# Patient Record
Sex: FEMALE | Race: WHITE | Employment: FULL TIME | URBAN - NONMETROPOLITAN AREA
[De-identification: names, ages, dates, MRNs, and addresses within clinical notes are randomized per-mention and may not be internally consistent; named-entity substitution may affect disease eponyms.]

---

## 2018-11-29 ENCOUNTER — MEDICAL CORRESPONDENCE (OUTPATIENT)
Dept: HEALTH INFORMATION MANAGEMENT | Facility: OTHER | Age: 38
End: 2018-11-29

## 2018-12-05 ENCOUNTER — HOSPITAL ENCOUNTER (OUTPATIENT)
Dept: MRI IMAGING | Facility: OTHER | Age: 38
Discharge: HOME | End: 2018-12-05
Attending: CHIROPRACTOR | Admitting: CHIROPRACTOR
Payer: COMMERCIAL

## 2018-12-05 DIAGNOSIS — S23.3XXA SPRAIN OF LIGAMENTS OF THORACIC SPINE: ICD-10-CM

## 2018-12-05 DIAGNOSIS — S33.100D: ICD-10-CM

## 2018-12-05 DIAGNOSIS — S13.4XXD SPRAIN OF LIGAMENTS OF CERVICAL SPINE, SUBSEQUENT ENCOUNTER: ICD-10-CM

## 2018-12-05 DIAGNOSIS — S23.100D SUBLUXATION OF THORACIC VERTEBRA, SUBSEQUENT ENCOUNTER: ICD-10-CM

## 2018-12-05 DIAGNOSIS — S13.100D: ICD-10-CM

## 2018-12-05 PROCEDURE — 72141 MRI NECK SPINE W/O DYE: CPT

## 2019-11-27 ENCOUNTER — HOSPITAL ENCOUNTER (OUTPATIENT)
Dept: MRI IMAGING | Facility: OTHER | Age: 39
Discharge: HOME | End: 2019-11-27
Attending: FAMILY MEDICINE | Admitting: FAMILY MEDICINE
Payer: COMMERCIAL

## 2019-11-27 DIAGNOSIS — M25.40 EFFUSION INTO JOINT: ICD-10-CM

## 2019-11-27 DIAGNOSIS — M25.461 SWELLING OF RIGHT KNEE JOINT: ICD-10-CM

## 2019-11-27 DIAGNOSIS — M25.561 RIGHT KNEE PAIN: ICD-10-CM

## 2019-11-27 PROCEDURE — 73721 MRI JNT OF LWR EXTRE W/O DYE: CPT | Mod: RT

## 2020-10-02 ENCOUNTER — HOSPITAL ENCOUNTER (EMERGENCY)
Facility: HOSPITAL | Age: 40
Discharge: HOME | End: 2020-10-02
Attending: PHYSICIAN ASSISTANT | Admitting: PHYSICIAN ASSISTANT

## 2020-10-02 VITALS
BODY MASS INDEX: 20.18 KG/M2 | WEIGHT: 125 LBS | HEART RATE: 70 BPM | SYSTOLIC BLOOD PRESSURE: 125 MMHG | DIASTOLIC BLOOD PRESSURE: 80 MMHG | RESPIRATION RATE: 16 BRPM | TEMPERATURE: 98.7 F | OXYGEN SATURATION: 100 %

## 2020-10-02 DIAGNOSIS — R00.2 PALPITATIONS: ICD-10-CM

## 2020-10-02 DIAGNOSIS — R07.89 CHEST DISCOMFORT: ICD-10-CM

## 2020-10-02 LAB
ANION GAP SERPL CALCULATED.3IONS-SCNC: 5 MMOL/L (ref 3–14)
BASOPHILS # BLD AUTO: 0.1 10E9/L (ref 0–0.2)
BASOPHILS NFR BLD AUTO: 1 %
BUN SERPL-MCNC: 15 MG/DL (ref 7–30)
CALCIUM SERPL-MCNC: 9.3 MG/DL (ref 8.5–10.1)
CHLORIDE SERPL-SCNC: 103 MMOL/L (ref 94–109)
CO2 SERPL-SCNC: 27 MMOL/L (ref 20–32)
CREAT SERPL-MCNC: 0.8 MG/DL (ref 0.52–1.04)
CRP SERPL-MCNC: <2.9 MG/L (ref 0–8)
D DIMER PPP FEU-MCNC: <0.3 UG/ML FEU (ref 0–0.5)
DIFFERENTIAL METHOD BLD: NORMAL
EOSINOPHIL # BLD AUTO: 0.1 10E9/L (ref 0–0.7)
EOSINOPHIL NFR BLD AUTO: 1 %
ERYTHROCYTE [DISTWIDTH] IN BLOOD BY AUTOMATED COUNT: 11.6 % (ref 10–15)
ERYTHROCYTE [SEDIMENTATION RATE] IN BLOOD BY WESTERGREN METHOD: 6 MM/H (ref 0–20)
GFR SERPL CREATININE-BSD FRML MDRD: >90 ML/MIN/{1.73_M2}
GLUCOSE SERPL-MCNC: 306 MG/DL (ref 70–99)
HCG SERPL QL: NEGATIVE
HCT VFR BLD AUTO: 38.1 % (ref 35–47)
HGB BLD-MCNC: 13.6 G/DL (ref 11.7–15.7)
IMM GRANULOCYTES # BLD: 0 10E9/L (ref 0–0.4)
IMM GRANULOCYTES NFR BLD: 0.4 %
LYMPHOCYTES # BLD AUTO: 1.6 10E9/L (ref 0.8–5.3)
LYMPHOCYTES NFR BLD AUTO: 31.6 %
MAGNESIUM SERPL-MCNC: 2.1 MG/DL (ref 1.6–2.3)
MCH RBC QN AUTO: 31.1 PG (ref 26.5–33)
MCHC RBC AUTO-ENTMCNC: 35.7 G/DL (ref 31.5–36.5)
MCV RBC AUTO: 87 FL (ref 78–100)
MONOCYTES # BLD AUTO: 0.3 10E9/L (ref 0–1.3)
MONOCYTES NFR BLD AUTO: 5.6 %
NEUTROPHILS # BLD AUTO: 3.1 10E9/L (ref 1.6–8.3)
NEUTROPHILS NFR BLD AUTO: 60.4 %
NRBC # BLD AUTO: 0 10*3/UL
NRBC BLD AUTO-RTO: 0 /100
PLATELET # BLD AUTO: 191 10E9/L (ref 150–450)
POTASSIUM SERPL-SCNC: 3.7 MMOL/L (ref 3.4–5.3)
RBC # BLD AUTO: 4.37 10E12/L (ref 3.8–5.2)
SODIUM SERPL-SCNC: 135 MMOL/L (ref 133–144)
TROPONIN I SERPL-MCNC: <0.015 UG/L (ref 0–0.04)
TSH SERPL DL<=0.005 MIU/L-ACNC: 0.55 MU/L (ref 0.4–4)
WBC # BLD AUTO: 5.2 10E9/L (ref 4–11)

## 2020-10-02 PROCEDURE — 86140 C-REACTIVE PROTEIN: CPT | Performed by: PHYSICIAN ASSISTANT

## 2020-10-02 PROCEDURE — 85025 COMPLETE CBC W/AUTO DIFF WBC: CPT | Performed by: PHYSICIAN ASSISTANT

## 2020-10-02 PROCEDURE — 80048 BASIC METABOLIC PNL TOTAL CA: CPT | Performed by: PHYSICIAN ASSISTANT

## 2020-10-02 PROCEDURE — 250N000011 HC RX IP 250 OP 636: Performed by: PHYSICIAN ASSISTANT

## 2020-10-02 PROCEDURE — 83735 ASSAY OF MAGNESIUM: CPT | Performed by: PHYSICIAN ASSISTANT

## 2020-10-02 PROCEDURE — 93005 ELECTROCARDIOGRAM TRACING: CPT

## 2020-10-02 PROCEDURE — 84443 ASSAY THYROID STIM HORMONE: CPT | Performed by: PHYSICIAN ASSISTANT

## 2020-10-02 PROCEDURE — 99284 EMERGENCY DEPT VISIT MOD MDM: CPT | Performed by: PHYSICIAN ASSISTANT

## 2020-10-02 PROCEDURE — 84703 CHORIONIC GONADOTROPIN ASSAY: CPT | Performed by: PHYSICIAN ASSISTANT

## 2020-10-02 PROCEDURE — 99284 EMERGENCY DEPT VISIT MOD MDM: CPT

## 2020-10-02 PROCEDURE — 85379 FIBRIN DEGRADATION QUANT: CPT | Performed by: PHYSICIAN ASSISTANT

## 2020-10-02 PROCEDURE — 84484 ASSAY OF TROPONIN QUANT: CPT | Performed by: PHYSICIAN ASSISTANT

## 2020-10-02 PROCEDURE — 85652 RBC SED RATE AUTOMATED: CPT | Performed by: PHYSICIAN ASSISTANT

## 2020-10-02 PROCEDURE — 93010 ELECTROCARDIOGRAM REPORT: CPT | Performed by: INTERNAL MEDICINE

## 2020-10-02 RX ORDER — DEXTROAMPHETAMINE SACCHARATE, AMPHETAMINE ASPARTATE, DEXTROAMPHETAMINE SULFATE AND AMPHETAMINE SULFATE 5; 5; 5; 5 MG/1; MG/1; MG/1; MG/1
20 TABLET ORAL 2 TIMES DAILY
Refills: 0 | COMMUNITY
Start: 2019-10-24 | End: 2021-03-31

## 2020-10-02 RX ORDER — ONDANSETRON 4 MG/1
8 TABLET, ORALLY DISINTEGRATING ORAL ONCE
Status: COMPLETED | OUTPATIENT
Start: 2020-10-02 | End: 2020-10-02

## 2020-10-02 RX ADMIN — ONDANSETRON 8 MG: 4 TABLET, ORALLY DISINTEGRATING ORAL at 20:37

## 2020-10-02 ASSESSMENT — ENCOUNTER SYMPTOMS
VOMITING: 0
FATIGUE: 1
EYES NEGATIVE: 1
CHEST TIGHTNESS: 1
NEUROLOGICAL NEGATIVE: 1
NAUSEA: 0
MUSCULOSKELETAL NEGATIVE: 1
PALPITATIONS: 1
WHEEZING: 0
COUGH: 0
FEVER: 0
ENDOCRINE NEGATIVE: 1
SHORTNESS OF BREATH: 0
ABDOMINAL PAIN: 0

## 2020-10-02 NOTE — ED NOTES
Patient passed out last Friday at 0100. Lost consciousness knew it was going to happen so laid down on the floor. States it took a day and a half to recover. Feels dizzy with some movements and lightheaded at times. Feels the palpitations. Normal HR is 50-60s. Feels fatigued at this time.

## 2020-10-02 NOTE — ED AVS SNAPSHOT
[facility]  [address] [address]  Phone: [phone]                                    Jaymie Morales   MRN: [id number]    Department: [facility]   Date of Visit: 10/2/2020           After Visit Summary Signature Page    I have received my discharge instructions, and my questions have been answered. I have discussed any challenges I see with this plan with the nurse or doctor.    ..........................................................................................................................................  Patient/Patient Representative Signature      ..........................................................................................................................................  Patient Representative Print Name and Relationship to Patient    ..................................................               ................................................  Date                                   Time    ..........................................................................................................................................  Reviewed by Signature/Title    ...................................................              ..............................................  Date                                               Time          22EPIC Rev 08/18

## 2020-10-03 NOTE — ED PROVIDER NOTES
"  History     Chief Complaint   Patient presents with     Palpitations     c/o \"feeling like my heart is being squeezed.\" c/o \"feeling like my heart is racing.\" syncopal episode one week ago.      HPI  Jaymie Morales is a 40 year old female who presents emergency department from home 1 week after a syncopal episode.  Patient reports that at the time of the syncopal episode, she began to feel lightheaded and, knowing she would pass out, laid down on the floor.  She does not know how long she was unconscious.  It took her about a day and a half to recover from the episode, feeling weak and fatigued during that time.  Patient reports a similar episode about 1 year ago that started while on a flight.  She felt so bad she ended up driving home with her mother and it took her some time to recover from that as well.  She is not aware of any observed episodes that would suggest seizure.      Patient is especially concerned about having rapid heart rate up to 130 today and elevated BP.  Normal HR for her is typically 50-60 and low BP.      Patient reports having syncopal episodes frequently since age 9.  She denies prior problems with chest pain as she describes today as (tightness-mild to moderate).  Symptoms are intermittently present regardless of level of physical activity.  Patient reports significant workup in the past including ECG, EEG, MRI.  She does not believe she has ever had stress test or echocardiogram.  She has no known prior history of arrhythmia.  No known family history of cardiac arrhythmia.  No personal or family history of PE/DVT.  Patient is not on birth control and does not smoke.   had vasectomy.      Reports problems with Raynaud's syndrome.  History of autoimmune disease resulting in Type 1 diabetes at age 30.  At age 33 had episode of severe abdominal pain while pregnant that resolved spontaneously.  Uncertain cause.  Patient has had 4 other similar episode with the most recent several " months ago.      Patient denies significant stressors. No problems with thyroid in the past.  Patient describes mild headache since episode.  No abdominal pain, nausea.      Allergies:  Allergies   Allergen Reactions     Sulfa Drugs Unknown and Hives     Sulfonamide Derivatives      Sulfa (Sulfonamide Antibiotics)       Problem List:    Patient Active Problem List    Diagnosis Date Noted     Diabetes mellitus type 1 (H) 2013     Priority: Medium        Past Medical History:    History reviewed. No pertinent past medical history.    Past Surgical History:    History reviewed. No pertinent surgical history.    Family History:    History reviewed. No pertinent family history.    Social History:  Marital Status:   [2]  Social History     Tobacco Use     Smoking status: Never Smoker     Smokeless tobacco: Never Used   Substance Use Topics     Alcohol use: No     Alcohol/week: 0.0 standard drinks     Drug use: Yes     Types: Marijuana     Comment: occasional        Medications:         amphetamine-dextroamphetamine (ADDERALL) 20 MG tablet       INSULIN PUMP - OUTPATIENT       Prenatal Multivit-Min-Fe-FA (PRE-MIRANDA FORMULA PO)       acetone, Urine, test (KETOSTIX) STRP       glucagon (GLUCAGON EMERGENCY) 1 MG injection       insulin aspart (NovoLOG) injection          Review of Systems   Constitutional: Positive for fatigue. Negative for fever.   HENT: Negative.    Eyes: Negative.    Respiratory: Positive for chest tightness. Negative for cough, shortness of breath and wheezing.    Cardiovascular: Positive for palpitations. Negative for chest pain and leg swelling.   Gastrointestinal: Negative for abdominal pain, nausea and vomiting.   Endocrine: Negative.    Genitourinary: Negative.    Musculoskeletal: Negative.    Skin: Negative.    Neurological: Negative.        Physical Exam   BP: 121/92  Pulse: 85  Temp: 98.4  F (36.9  C)  Resp: 16  Weight: 56.7 kg (125 lb)  SpO2: 96 %      Physical Exam  Constitutional:        General: She is not in acute distress.     Appearance: She is not diaphoretic.   HENT:      Head: Normocephalic and atraumatic.      Mouth/Throat:      Pharynx: No oropharyngeal exudate.   Eyes:      General: No scleral icterus.     Extraocular Movements: Extraocular movements intact.      Conjunctiva/sclera: Conjunctivae normal.      Pupils: Pupils are equal, round, and reactive to light.   Neck:      Musculoskeletal: Normal range of motion.   Cardiovascular:      Rate and Rhythm: Normal rate and regular rhythm.      Heart sounds: Normal heart sounds.   Pulmonary:      Effort: No respiratory distress.      Breath sounds: Normal breath sounds.   Abdominal:      General: Bowel sounds are normal.      Palpations: Abdomen is soft.      Tenderness: There is no abdominal tenderness.   Musculoskeletal:         General: No tenderness.   Skin:     General: Skin is warm.      Findings: No rash.   Neurological:      Mental Status: She is alert.         ED Course        Procedures    ECG and troponin unremarkable.  No concern for MI, no evidence of arrhythmia.  Order for Ziopatch placed.      BMP, Magnesium, TSH unremarkable.     D-dimer neg.  No concern for PE.  CXR neg for pneumonia, pneumothorax.     CBC unremarkable. Inflammatory markers unremarkable.      Neg pregnancy test.               EKG Interpretation:      Interpreted by ZEINAB Wade  Time reviewed: 1750  Symptoms at time of EKG: chest discomfort, palpitations   Rhythm: normal sinus   Rate: 68 bpm  Axis: normal  Ectopy: none  Conduction: normal  ST Segments/ T Waves: No ST-T wave changes  Q Waves: none  Comparison to prior: Unchanged from 12/24/2013    Clinical Impression: normal EKG           Results for orders placed or performed during the hospital encounter of 10/02/20 (from the past 24 hour(s))   CBC with platelets differential   Result Value Ref Range    WBC 5.2 4.0 - 11.0 10e9/L    RBC Count 4.37 3.8 - 5.2 10e12/L    Hemoglobin 13.6 11.7 - 15.7  g/dL    Hematocrit 38.1 35.0 - 47.0 %    MCV 87 78 - 100 fl    MCH 31.1 26.5 - 33.0 pg    MCHC 35.7 31.5 - 36.5 g/dL    RDW 11.6 10.0 - 15.0 %    Platelet Count 191 150 - 450 10e9/L    Diff Method Automated Method     % Neutrophils 60.4 %    % Lymphocytes 31.6 %    % Monocytes 5.6 %    % Eosinophils 1.0 %    % Basophils 1.0 %    % Immature Granulocytes 0.4 %    Nucleated RBCs 0 0 /100    Absolute Neutrophil 3.1 1.6 - 8.3 10e9/L    Absolute Lymphocytes 1.6 0.8 - 5.3 10e9/L    Absolute Monocytes 0.3 0.0 - 1.3 10e9/L    Absolute Eosinophils 0.1 0.0 - 0.7 10e9/L    Absolute Basophils 0.1 0.0 - 0.2 10e9/L    Abs Immature Granulocytes 0.0 0 - 0.4 10e9/L    Absolute Nucleated RBC 0.0    Basic metabolic panel   Result Value Ref Range    Sodium 135 133 - 144 mmol/L    Potassium 3.7 3.4 - 5.3 mmol/L    Chloride 103 94 - 109 mmol/L    Carbon Dioxide 27 20 - 32 mmol/L    Anion Gap 5 3 - 14 mmol/L    Glucose 306 (H) 70 - 99 mg/dL    Urea Nitrogen 15 7 - 30 mg/dL    Creatinine 0.80 0.52 - 1.04 mg/dL    GFR Estimate >90 >60 mL/min/[1.73_m2]    GFR Estimate If Black >90 >60 mL/min/[1.73_m2]    Calcium 9.3 8.5 - 10.1 mg/dL   Troponin I   Result Value Ref Range    Troponin I ES <0.015 0.000 - 0.045 ug/L   Magnesium   Result Value Ref Range    Magnesium 2.1 1.6 - 2.3 mg/dL   TSH with free T4 reflex   Result Value Ref Range    TSH 0.55 0.40 - 4.00 mU/L   HCG qualitative Blood   Result Value Ref Range    HCG Qualitative Serum Negative NEG^Negative   CRP inflammation   Result Value Ref Range    CRP Inflammation <2.9 0.0 - 8.0 mg/L   Erythrocyte sedimentation rate auto   Result Value Ref Range    Sed Rate 6 0 - 20 mm/h   D dimer quantitative   Result Value Ref Range    D Dimer <0.3 0.0 - 0.50 ug/ml FEU       Medications   ondansetron (ZOFRAN-ODT) ODT tab 8 mg (8 mg Oral Given 10/2/20 2037)       Assessments & Plan (with Medical Decision Making)     I have reviewed the nursing notes.    I have reviewed the findings, diagnosis, plan and  need for follow up with the patient.    Discharge Medication List as of 10/2/2020  8:24 PM          Final diagnoses:   Palpitations   Chest discomfort     ZEINAB Wade on 10/2/2020 at 10:33 PM   10/2/2020   [facility]     Sanju Morton PA  10/02/20 2232

## 2020-10-03 NOTE — ED NOTES
Pt given 8mg ODT zofran prior to discharge. Instructed on holter monitor placement early next week and given phone number in case she doesn't hear. Pt ambulated out of ER and will return or call with any further questions or concerns.

## 2020-10-03 NOTE — ED NOTES
"Pt ambulated halls and 2 flights of stairs with this writer. Spo2 95% and  at beginning of walk, after flights of stairs  and Spo2 95%. Pt continues to endorse feelings of \"different and not normal\" Worse when ambulating stairs than at rest.   "

## 2020-10-08 ENCOUNTER — TELEPHONE (OUTPATIENT)
Dept: EMERGENCY MEDICINE | Facility: HOSPITAL | Age: 40
End: 2020-10-08

## 2020-12-20 ENCOUNTER — HEALTH MAINTENANCE LETTER (OUTPATIENT)
Age: 40
End: 2020-12-20

## 2021-02-23 ENCOUNTER — TRANSFERRED RECORDS (OUTPATIENT)
Dept: SURGERY | Facility: CLINIC | Age: 41
End: 2021-02-23

## 2021-03-15 ENCOUNTER — OFFICE VISIT (OUTPATIENT)
Dept: SURGERY | Facility: CLINIC | Age: 41
End: 2021-03-15
Payer: COMMERCIAL

## 2021-03-15 ENCOUNTER — PREP FOR PROCEDURE (OUTPATIENT)
Dept: SURGERY | Facility: CLINIC | Age: 41
End: 2021-03-15

## 2021-03-15 VITALS
SYSTOLIC BLOOD PRESSURE: 118 MMHG | WEIGHT: 125 LBS | HEIGHT: 66 IN | BODY MASS INDEX: 20.09 KG/M2 | OXYGEN SATURATION: 100 % | HEART RATE: 89 BPM | DIASTOLIC BLOOD PRESSURE: 72 MMHG | RESPIRATION RATE: 16 BRPM

## 2021-03-15 DIAGNOSIS — K81.1 CHRONIC CHOLECYSTITIS: Primary | ICD-10-CM

## 2021-03-15 PROCEDURE — 99203 OFFICE O/P NEW LOW 30 MIN: CPT | Performed by: SURGERY

## 2021-03-15 ASSESSMENT — MIFFLIN-ST. JEOR: SCORE: 1253.75

## 2021-03-15 NOTE — PROGRESS NOTES
Chief complaint:  Abdominal pain, epigastric    HPI:  This patient is a 40 year old  female who presents with epigastric region abdominal pain for the past 10 years.  The pain is intermittent.  It occurs with no specific relation to eating.  She has had an EGD with biopsies negative for H. pylori and for sprue.    She is type I diabetic    Past Medical History:   has no past medical history on file.    Past Surgical History:  No past surgical history on file. Care everywhere indicates that she had a lysis of adhesions involving the left ovary, this seems to have been done laparoscopically.    Social History:  Social History     Socioeconomic History     Marital status:      Spouse name: Not on file     Number of children: Not on file     Years of education: Not on file     Highest education level: Not on file   Occupational History     Not on file   Social Needs     Financial resource strain: Not on file     Food insecurity     Worry: Not on file     Inability: Not on file     Transportation needs     Medical: Not on file     Non-medical: Not on file   Tobacco Use     Smoking status: Never Smoker     Smokeless tobacco: Never Used   Substance and Sexual Activity     Alcohol use: No     Alcohol/week: 0.0 standard drinks     Drug use: Yes     Types: Marijuana     Comment: occasional     Sexual activity: Not on file   Lifestyle     Physical activity     Days per week: Not on file     Minutes per session: Not on file     Stress: Not on file   Relationships     Social connections     Talks on phone: Not on file     Gets together: Not on file     Attends [employer] service: Not on file     Active member of club or organization: Not on file     Attends meetings of clubs or organizations: Not on file     Relationship status: Not on file     Intimate partner violence     Fear of current or ex partner: Not on file     Emotionally abused: Not on file     Physically abused: Not on file     Forced sexual activity:  Not on file   Other Topics Concern     Parent/sibling w/ CABG, MI or angioplasty before 65F 55M? Not Asked   Social History Narrative     Not on file        Family History:  No family history on file.    Review of Systems:  GI - no jaundice  resp - negative  The 10 point Review of Systems is negative other than noted in the HPI and above.    Physical Exam:  General - This is a well developed, well nourished female in no apparent distress.  HEENT - Normocephalic, atraumatic.  NO scleral icterus.  Skin - no jaundice or rash  Neck - supple without masses  Neurologic: alert, speech is clear, moves all extremities with good strength  Psychiatric: Mood and affect are appropriate  Lungs - respirations regular and non-labored.    Abdomen:   soft, non-distended with no tenderness noted . no masses palpated.  Glucose monitor is in place on her abdomen, she will move this to another location at the time of surgery..  Extremities - warm without edema      Relevant labs: None recently  Liver function panel- normal  WBC- normal  Lipase- normal    Imaging:  Ultrasound RUQ: negative cholelithiasis, negative gallbladder wall thickening, negative ductal dilatation (1.7 mm), negative pericholecystic fluid, negative sonographic Huang's sign.  Gallbladder radionuclide scan shows normal uptake and drainage but an ejection fraction of 6%  All imaging was performed at [facility]    Assessment and Plan:  It is my impression that she has chronic cholecystitis/biliary dyskinesia.  I have offered her a Laparoscopic cholecystectomy with cholangiograms.  We specifically discussed the possibility that her symptoms are not from her gallbladder and will not be resolved with cholecystectomy.  We have discussed the indication, risks and expected recovery.  Risks discussed included duct/ organ injury, conversion to open surgery with increased recovery, post cholecystectomy syndromes and their treatment.   We also discussed alternatives  including dissolution, lithotripsy and low fat diet. Literature was given to review.  We will work on scheduling surgery at the patient s convenience.    Heath Cutler MD  [facility]    Please route or send letter to:  Primary Care Provider (PCP), Referring Provider and Include Progress Note    Level 4

## 2021-03-15 NOTE — LETTER
March 15, 2021          [facility]  [address] [place] [address]      RE:   Jaymie Morales 1980      Dear Colleague,    Thank you for referring your patient, Jaymie Morales, to [facility]. Please see a copy of my visit note below.    Chief complaint:   Abdominal pain, epigastric     HPI:   This patient is a 40 year old  female who presents with epigastric region abdominal pain for the past 10 years. The pain is intermittent. It occurs with no specific relation to eating. She has had an     EGD with biopsies negative for H. pylori and for sprue. She is type I diabetic     Assessment and Plan:   It is my impression that she has chronic cholecystitis/biliary dyskinesia.   I have offered her a Laparoscopic cholecystectomy with cholangiograms. We specifically discussed the possibility that her symptoms are not from her gallbladder and will not be resolved with cholecystectomy. We have discussed the indication, risks and expected recovery. Risks discussed included duct/ organ injury, conversion to open surgery with increased recovery, post cholecystectomy syndromes and their treatment.   We also discussed alternatives including dissolution, lithotripsy and low fat diet. Literature was given to review.   We will work on scheduling surgery at the patient s convenience.      Again, thank you for allowing me to participate in the care of your patient.      Sincerely,      Heath Cutler MD

## 2021-03-17 ENCOUNTER — TELEPHONE (OUTPATIENT)
Dept: SURGERY | Facility: CLINIC | Age: 41
End: 2021-03-17

## 2021-03-17 DIAGNOSIS — Z11.59 ENCOUNTER FOR SCREENING FOR OTHER VIRAL DISEASES: ICD-10-CM

## 2021-03-31 RX ORDER — DEXTROAMPHETAMINE SACCHARATE, AMPHETAMINE ASPARTATE, DEXTROAMPHETAMINE SULFATE AND AMPHETAMINE SULFATE 2.5; 2.5; 2.5; 2.5 MG/1; MG/1; MG/1; MG/1
10 TABLET ORAL DAILY
Status: ON HOLD | COMMUNITY
End: 2021-04-02

## 2021-03-31 RX ORDER — DEXTROAMPHETAMINE SACCHARATE, AMPHETAMINE ASPARTATE, DEXTROAMPHETAMINE SULFATE AND AMPHETAMINE SULFATE 5; 5; 5; 5 MG/1; MG/1; MG/1; MG/1
20 TABLET ORAL EVERY MORNING
COMMUNITY

## 2021-04-01 ENCOUNTER — ANESTHESIA (OUTPATIENT)
Dept: SURGERY | Facility: CLINIC | Age: 41
End: 2021-04-01
Payer: COMMERCIAL

## 2021-04-01 ENCOUNTER — ANESTHESIA EVENT (OUTPATIENT)
Dept: SURGERY | Facility: CLINIC | Age: 41
End: 2021-04-01
Payer: COMMERCIAL

## 2021-04-01 ENCOUNTER — APPOINTMENT (OUTPATIENT)
Dept: GENERAL RADIOLOGY | Facility: CLINIC | Age: 41
End: 2021-04-01
Attending: SURGERY
Payer: COMMERCIAL

## 2021-04-01 ENCOUNTER — APPOINTMENT (OUTPATIENT)
Dept: CT IMAGING | Facility: CLINIC | Age: 41
End: 2021-04-01
Attending: PHYSICIAN ASSISTANT
Payer: COMMERCIAL

## 2021-04-01 ENCOUNTER — HOSPITAL ENCOUNTER (INPATIENT)
Facility: CLINIC | Age: 41
LOS: 5 days | Discharge: HOME | End: 2021-04-06
Attending: SURGERY | Admitting: SURGERY
Payer: COMMERCIAL

## 2021-04-01 ENCOUNTER — APPOINTMENT (OUTPATIENT)
Dept: SURGERY | Facility: PHYSICIAN GROUP | Age: 41
End: 2021-04-01
Payer: COMMERCIAL

## 2021-04-01 ENCOUNTER — RESULTS ONLY (OUTPATIENT)
Dept: ADMINISTRATIVE | Facility: CLINIC | Age: 41
End: 2021-04-01

## 2021-04-01 ENCOUNTER — SURGERY (OUTPATIENT)
Age: 41
End: 2021-04-01
Payer: COMMERCIAL

## 2021-04-01 DIAGNOSIS — K81.1 CHRONIC CHOLECYSTITIS: ICD-10-CM

## 2021-04-01 LAB
ANION GAP SERPL CALCULATED.3IONS-SCNC: 3 MMOL/L (ref 3–14)
BASE EXCESS BLDV CALC-SCNC: 1.5 MMOL/L
BUN SERPL-MCNC: 17 MG/DL (ref 7–30)
CALCIUM SERPL-MCNC: 7.8 MG/DL (ref 8.5–10.1)
CHLORIDE SERPL-SCNC: 107 MMOL/L (ref 94–109)
CO2 SERPL-SCNC: 28 MMOL/L (ref 20–32)
CREAT SERPL-MCNC: 0.83 MG/DL (ref 0.52–1.04)
ERYTHROCYTE [DISTWIDTH] IN BLOOD BY AUTOMATED COUNT: 12 % (ref 10–15)
ERYTHROCYTE [DISTWIDTH] IN BLOOD BY AUTOMATED COUNT: 12 % (ref 10–15)
GFR SERPL CREATININE-BSD FRML MDRD: 88 ML/MIN/{1.73_M2}
GLUCOSE BLDC GLUCOMTR-MCNC: 107 MG/DL (ref 70–99)
GLUCOSE BLDC GLUCOMTR-MCNC: 131 MG/DL (ref 70–99)
GLUCOSE SERPL-MCNC: 157 MG/DL (ref 70–99)
HCG UR QL: NEGATIVE
HCO3 BLDV-SCNC: 28 MMOL/L (ref 21–28)
HCT VFR BLD AUTO: 24.6 % (ref 35–47)
HCT VFR BLD AUTO: 34.1 % (ref 35–47)
HGB BLD-MCNC: 11.5 G/DL (ref 11.7–15.7)
HGB BLD-MCNC: 7.9 G/DL (ref 11.7–15.7)
HGB BLD-MCNC: 8.5 G/DL (ref 11.7–15.7)
HGB BLD-MCNC: 9.8 G/DL (ref 11.7–15.7)
LACTATE BLD-SCNC: 1.7 MMOL/L (ref 0.7–2)
MCH RBC QN AUTO: 32.2 PG (ref 26.5–33)
MCH RBC QN AUTO: 32.4 PG (ref 26.5–33)
MCHC RBC AUTO-ENTMCNC: 33.7 G/DL (ref 31.5–36.5)
MCHC RBC AUTO-ENTMCNC: 34.6 G/DL (ref 31.5–36.5)
MCV RBC AUTO: 93 FL (ref 78–100)
MCV RBC AUTO: 96 FL (ref 78–100)
O2/TOTAL GAS SETTING VFR VENT: ABNORMAL %
OXYHGB MFR BLDV: 27 %
PCO2 BLDV: 56 MM HG (ref 40–50)
PH BLDV: 7.32 PH (ref 7.32–7.43)
PLATELET # BLD AUTO: 228 10E9/L (ref 150–450)
PLATELET # BLD AUTO: 235 10E9/L (ref 150–450)
PO2 BLDV: 20 MM HG (ref 25–47)
POTASSIUM SERPL-SCNC: 4.1 MMOL/L (ref 3.4–5.3)
PROLACTIN SERPL-MCNC: 17 UG/L (ref 3–27)
RBC # BLD AUTO: 2.64 10E12/L (ref 3.8–5.2)
RBC # BLD AUTO: 3.55 10E12/L (ref 3.8–5.2)
SODIUM SERPL-SCNC: 138 MMOL/L (ref 133–144)
WBC # BLD AUTO: 10.1 10E9/L (ref 4–11)
WBC # BLD AUTO: 9.3 10E9/L (ref 4–11)

## 2021-04-01 PROCEDURE — 85027 COMPLETE CBC AUTOMATED: CPT | Performed by: ANESTHESIOLOGY

## 2021-04-01 PROCEDURE — 88304 TISSUE EXAM BY PATHOLOGIST: CPT | Mod: TC | Performed by: SURGERY

## 2021-04-01 PROCEDURE — 255N000002 HC RX 255 OP 636: Performed by: SURGERY

## 2021-04-01 PROCEDURE — 74177 CT ABD & PELVIS W/CONTRAST: CPT

## 2021-04-01 PROCEDURE — 370N000017 HC ANESTHESIA TECHNICAL FEE, PER MIN: Performed by: SURGERY

## 2021-04-01 PROCEDURE — 36415 COLL VENOUS BLD VENIPUNCTURE: CPT | Performed by: PHYSICIAN ASSISTANT

## 2021-04-01 PROCEDURE — 250N000011 HC RX IP 250 OP 636: Performed by: ANESTHESIOLOGY

## 2021-04-01 PROCEDURE — 88304 TISSUE EXAM BY PATHOLOGIST: CPT | Mod: 26 | Performed by: PATHOLOGY

## 2021-04-01 PROCEDURE — 85018 HEMOGLOBIN: CPT | Performed by: PHYSICIAN ASSISTANT

## 2021-04-01 PROCEDURE — 360N000076 HC SURGERY LEVEL 3, PER MIN: Performed by: SURGERY

## 2021-04-01 PROCEDURE — BF131ZZ FLUOROSCOPY OF GALLBLADDER AND BILE DUCTS USING LOW OSMOLAR CONTRAST: ICD-10-PCS | Performed by: SURGERY

## 2021-04-01 PROCEDURE — 0D9W4ZZ DRAINAGE OF PERITONEUM, PERCUTANEOUS ENDOSCOPIC APPROACH: ICD-10-PCS | Performed by: FAMILY MEDICINE

## 2021-04-01 PROCEDURE — 250N000011 HC RX IP 250 OP 636: Performed by: PHYSICIAN ASSISTANT

## 2021-04-01 PROCEDURE — 80048 BASIC METABOLIC PNL TOTAL CA: CPT | Performed by: ANESTHESIOLOGY

## 2021-04-01 PROCEDURE — 47563 LAPARO CHOLECYSTECTOMY/GRAPH: CPT | Performed by: SURGERY

## 2021-04-01 PROCEDURE — 86850 RBC ANTIBODY SCREEN: CPT | Performed by: PHYSICIAN ASSISTANT

## 2021-04-01 PROCEDURE — 258N000003 HC RX IP 258 OP 636: Performed by: INTERNAL MEDICINE

## 2021-04-01 PROCEDURE — 83605 ASSAY OF LACTIC ACID: CPT | Performed by: PHYSICIAN ASSISTANT

## 2021-04-01 PROCEDURE — 250N000009 HC RX 250: Performed by: NURSE ANESTHETIST, CERTIFIED REGISTERED

## 2021-04-01 PROCEDURE — 86900 BLOOD TYPING SEROLOGIC ABO: CPT | Performed by: PHYSICIAN ASSISTANT

## 2021-04-01 PROCEDURE — 99291 CRITICAL CARE FIRST HOUR: CPT | Performed by: INTERNAL MEDICINE

## 2021-04-01 PROCEDURE — 82805 BLOOD GASES W/O2 SATURATION: CPT | Performed by: PHYSICIAN ASSISTANT

## 2021-04-01 PROCEDURE — 86923 COMPATIBILITY TEST ELECTRIC: CPT | Performed by: PHYSICIAN ASSISTANT

## 2021-04-01 PROCEDURE — 93010 ELECTROCARDIOGRAM REPORT: CPT | Mod: 76 | Performed by: INTERNAL MEDICINE

## 2021-04-01 PROCEDURE — 86901 BLOOD TYPING SEROLOGIC RH(D): CPT | Performed by: PHYSICIAN ASSISTANT

## 2021-04-01 PROCEDURE — 710N000009 HC RECOVERY PHASE 1, LEVEL 1, PER MIN: Performed by: SURGERY

## 2021-04-01 PROCEDURE — 258N000003 HC RX IP 258 OP 636: Performed by: ANESTHESIOLOGY

## 2021-04-01 PROCEDURE — 999N000141 HC STATISTIC PRE-PROCEDURE NURSING ASSESSMENT: Performed by: SURGERY

## 2021-04-01 PROCEDURE — 710N000012 HC RECOVERY PHASE 2, PER MINUTE: Performed by: SURGERY

## 2021-04-01 PROCEDURE — 258N000003 HC RX IP 258 OP 636: Performed by: SURGERY

## 2021-04-01 PROCEDURE — 10140 I&D HMTMA SEROMA/FLUID COLLJ: CPT | Mod: 78 | Performed by: SURGERY

## 2021-04-01 PROCEDURE — 70450 CT HEAD/BRAIN W/O DYE: CPT

## 2021-04-01 PROCEDURE — 93005 ELECTROCARDIOGRAM TRACING: CPT

## 2021-04-01 PROCEDURE — 99222 1ST HOSP IP/OBS MODERATE 55: CPT | Performed by: PHYSICIAN ASSISTANT

## 2021-04-01 PROCEDURE — 250N000011 HC RX IP 250 OP 636: Performed by: NURSE ANESTHETIST, CERTIFIED REGISTERED

## 2021-04-01 PROCEDURE — 36415 COLL VENOUS BLD VENIPUNCTURE: CPT | Performed by: ANESTHESIOLOGY

## 2021-04-01 PROCEDURE — 250N000011 HC RX IP 250 OP 636: Performed by: INTERNAL MEDICINE

## 2021-04-01 PROCEDURE — 47563 LAPARO CHOLECYSTECTOMY/GRAPH: CPT | Mod: AS | Performed by: PHYSICIAN ASSISTANT

## 2021-04-01 PROCEDURE — 99207 PR NO BILLABLE SERVICE THIS VISIT: CPT | Performed by: INTERNAL MEDICINE

## 2021-04-01 PROCEDURE — 360N000083 HC SURGERY LEVEL 3 W/ FLUORO, PER MIN: Performed by: SURGERY

## 2021-04-01 PROCEDURE — 120N000001 HC R&B MED SURG/OB

## 2021-04-01 PROCEDURE — 258N000003 HC RX IP 258 OP 636: Performed by: PHYSICIAN ASSISTANT

## 2021-04-01 PROCEDURE — 999N000157 HC STATISTIC RCP TIME EA 10 MIN

## 2021-04-01 PROCEDURE — 250N000011 HC RX IP 250 OP 636: Performed by: SURGERY

## 2021-04-01 PROCEDURE — 999N000179 XR SURGERY CARM FLUORO LESS THAN 5 MIN W STILLS

## 2021-04-01 PROCEDURE — 81025 URINE PREGNANCY TEST: CPT | Performed by: ANESTHESIOLOGY

## 2021-04-01 PROCEDURE — 250N000013 HC RX MED GY IP 250 OP 250 PS 637: Performed by: ANESTHESIOLOGY

## 2021-04-01 PROCEDURE — 0FT44ZZ RESECTION OF GALLBLADDER, PERCUTANEOUS ENDOSCOPIC APPROACH: ICD-10-PCS | Performed by: SURGERY

## 2021-04-01 PROCEDURE — 250N000013 HC RX MED GY IP 250 OP 250 PS 637: Performed by: PHYSICIAN ASSISTANT

## 2021-04-01 PROCEDURE — 250N000009 HC RX 250: Performed by: SURGERY

## 2021-04-01 PROCEDURE — 999N001017 HC STATISTIC GLUCOSE BY METER IP

## 2021-04-01 PROCEDURE — 250N000013 HC RX MED GY IP 250 OP 250 PS 637: Performed by: SURGERY

## 2021-04-01 PROCEDURE — 84146 ASSAY OF PROLACTIN: CPT | Performed by: ANESTHESIOLOGY

## 2021-04-01 PROCEDURE — 06L44ZZ OCCLUSION OF HEPATIC VEIN, PERCUTANEOUS ENDOSCOPIC APPROACH: ICD-10-PCS | Performed by: FAMILY MEDICINE

## 2021-04-01 PROCEDURE — 272N000001 HC OR GENERAL SUPPLY STERILE: Performed by: SURGERY

## 2021-04-01 PROCEDURE — 84443 ASSAY THYROID STIM HORMONE: CPT | Performed by: INTERNAL MEDICINE

## 2021-04-01 PROCEDURE — 258N000001 HC RX 258: Performed by: SURGERY

## 2021-04-01 PROCEDURE — 85027 COMPLETE CBC AUTOMATED: CPT | Performed by: PHYSICIAN ASSISTANT

## 2021-04-01 RX ORDER — SODIUM CHLORIDE 9 MG/ML
INJECTION, SOLUTION INTRAVENOUS CONTINUOUS
Status: DISCONTINUED | OUTPATIENT
Start: 2021-04-01 | End: 2021-04-02

## 2021-04-01 RX ORDER — ONDANSETRON 2 MG/ML
4 INJECTION INTRAMUSCULAR; INTRAVENOUS EVERY 30 MIN PRN
Status: DISCONTINUED | OUTPATIENT
Start: 2021-04-01 | End: 2021-04-01 | Stop reason: HOSPADM

## 2021-04-01 RX ORDER — LIDOCAINE 40 MG/G
CREAM TOPICAL
Status: DISCONTINUED | OUTPATIENT
Start: 2021-04-01 | End: 2021-04-02 | Stop reason: HOSPADM

## 2021-04-01 RX ORDER — SIMETHICONE 80 MG
80 TABLET,CHEWABLE ORAL EVERY 6 HOURS PRN
Status: DISCONTINUED | OUTPATIENT
Start: 2021-04-01 | End: 2021-04-06 | Stop reason: HOSPADM

## 2021-04-01 RX ORDER — LIDOCAINE HYDROCHLORIDE 10 MG/ML
INJECTION, SOLUTION INFILTRATION; PERINEURAL PRN
Status: DISCONTINUED | OUTPATIENT
Start: 2021-04-01 | End: 2021-04-02

## 2021-04-01 RX ORDER — FENTANYL CITRATE 50 UG/ML
50 INJECTION, SOLUTION INTRAMUSCULAR; INTRAVENOUS EVERY 10 MIN PRN
Status: DISCONTINUED | OUTPATIENT
Start: 2021-04-01 | End: 2021-04-05

## 2021-04-01 RX ORDER — FENTANYL CITRATE 50 UG/ML
50 INJECTION, SOLUTION INTRAMUSCULAR; INTRAVENOUS ONCE
Status: COMPLETED | OUTPATIENT
Start: 2021-04-01 | End: 2021-04-01

## 2021-04-01 RX ORDER — KETOROLAC TROMETHAMINE 30 MG/ML
30 INJECTION, SOLUTION INTRAMUSCULAR; INTRAVENOUS EVERY 6 HOURS PRN
Status: DISCONTINUED | OUTPATIENT
Start: 2021-04-01 | End: 2021-04-01 | Stop reason: HOSPADM

## 2021-04-01 RX ORDER — NALOXONE HYDROCHLORIDE 0.4 MG/ML
0.4 INJECTION, SOLUTION INTRAMUSCULAR; INTRAVENOUS; SUBCUTANEOUS
Status: DISCONTINUED | OUTPATIENT
Start: 2021-04-01 | End: 2021-04-06 | Stop reason: HOSPADM

## 2021-04-01 RX ORDER — PROPOFOL 10 MG/ML
INJECTION, EMULSION INTRAVENOUS CONTINUOUS PRN
Status: DISCONTINUED | OUTPATIENT
Start: 2021-04-01 | End: 2021-04-01

## 2021-04-01 RX ORDER — NALOXONE HYDROCHLORIDE 0.4 MG/ML
0.2 INJECTION, SOLUTION INTRAMUSCULAR; INTRAVENOUS; SUBCUTANEOUS
Status: DISCONTINUED | OUTPATIENT
Start: 2021-04-01 | End: 2021-04-01 | Stop reason: HOSPADM

## 2021-04-01 RX ORDER — SODIUM CHLORIDE, SODIUM LACTATE, POTASSIUM CHLORIDE, CALCIUM CHLORIDE 600; 310; 30; 20 MG/100ML; MG/100ML; MG/100ML; MG/100ML
INJECTION, SOLUTION INTRAVENOUS CONTINUOUS
Status: DISCONTINUED | OUTPATIENT
Start: 2021-04-01 | End: 2021-04-01 | Stop reason: HOSPADM

## 2021-04-01 RX ORDER — IOPAMIDOL 755 MG/ML
500 INJECTION, SOLUTION INTRAVASCULAR ONCE
Status: COMPLETED | OUTPATIENT
Start: 2021-04-01 | End: 2021-04-01

## 2021-04-01 RX ORDER — NALOXONE HYDROCHLORIDE 0.4 MG/ML
0.2 INJECTION, SOLUTION INTRAMUSCULAR; INTRAVENOUS; SUBCUTANEOUS
Status: DISCONTINUED | OUTPATIENT
Start: 2021-04-01 | End: 2021-04-06 | Stop reason: HOSPADM

## 2021-04-01 RX ORDER — NEOSTIGMINE METHYLSULFATE 1 MG/ML
VIAL (ML) INJECTION PRN
Status: DISCONTINUED | OUTPATIENT
Start: 2021-04-01 | End: 2021-04-01

## 2021-04-01 RX ORDER — FENTANYL CITRATE 50 UG/ML
INJECTION, SOLUTION INTRAMUSCULAR; INTRAVENOUS PRN
Status: DISCONTINUED | OUTPATIENT
Start: 2021-04-01 | End: 2021-04-01

## 2021-04-01 RX ORDER — ACETAMINOPHEN 325 MG/1
975 TABLET ORAL ONCE
Status: COMPLETED | OUTPATIENT
Start: 2021-04-01 | End: 2021-04-01

## 2021-04-01 RX ORDER — GLYCOPYRROLATE 0.2 MG/ML
INJECTION, SOLUTION INTRAMUSCULAR; INTRAVENOUS PRN
Status: DISCONTINUED | OUTPATIENT
Start: 2021-04-01 | End: 2021-04-02

## 2021-04-01 RX ORDER — ONDANSETRON 2 MG/ML
INJECTION INTRAMUSCULAR; INTRAVENOUS PRN
Status: DISCONTINUED | OUTPATIENT
Start: 2021-04-01 | End: 2021-04-01

## 2021-04-01 RX ORDER — CEFAZOLIN SODIUM 2 G/100ML
2 INJECTION, SOLUTION INTRAVENOUS SEE ADMIN INSTRUCTIONS
Status: DISCONTINUED | OUTPATIENT
Start: 2021-04-01 | End: 2021-04-02 | Stop reason: HOSPADM

## 2021-04-01 RX ORDER — CEFAZOLIN SODIUM 2 G/100ML
2 INJECTION, SOLUTION INTRAVENOUS
Status: DISCONTINUED | OUTPATIENT
Start: 2021-04-01 | End: 2021-04-01 | Stop reason: HOSPADM

## 2021-04-01 RX ORDER — LIDOCAINE HYDROCHLORIDE 10 MG/ML
INJECTION, SOLUTION INFILTRATION; PERINEURAL PRN
Status: DISCONTINUED | OUTPATIENT
Start: 2021-04-01 | End: 2021-04-01

## 2021-04-01 RX ORDER — MEPERIDINE HYDROCHLORIDE 25 MG/ML
12.5 INJECTION INTRAMUSCULAR; INTRAVENOUS; SUBCUTANEOUS
Status: DISCONTINUED | OUTPATIENT
Start: 2021-04-01 | End: 2021-04-01 | Stop reason: HOSPADM

## 2021-04-01 RX ORDER — DEXAMETHASONE SODIUM PHOSPHATE 4 MG/ML
INJECTION, SOLUTION INTRA-ARTICULAR; INTRALESIONAL; INTRAMUSCULAR; INTRAVENOUS; SOFT TISSUE PRN
Status: DISCONTINUED | OUTPATIENT
Start: 2021-04-01 | End: 2021-04-01

## 2021-04-01 RX ORDER — PROPOFOL 10 MG/ML
INJECTION, EMULSION INTRAVENOUS PRN
Status: DISCONTINUED | OUTPATIENT
Start: 2021-04-01 | End: 2021-04-01

## 2021-04-01 RX ORDER — LIDOCAINE 40 MG/G
CREAM TOPICAL
Status: DISCONTINUED | OUTPATIENT
Start: 2021-04-01 | End: 2021-04-02

## 2021-04-01 RX ORDER — ONDANSETRON 4 MG/1
4 TABLET, ORALLY DISINTEGRATING ORAL EVERY 6 HOURS PRN
Status: DISCONTINUED | OUTPATIENT
Start: 2021-04-01 | End: 2021-04-02

## 2021-04-01 RX ORDER — LABETALOL 20 MG/4 ML (5 MG/ML) INTRAVENOUS SYRINGE
10
Status: DISCONTINUED | OUTPATIENT
Start: 2021-04-01 | End: 2021-04-01 | Stop reason: HOSPADM

## 2021-04-01 RX ORDER — PROPOFOL 10 MG/ML
INJECTION, EMULSION INTRAVENOUS PRN
Status: DISCONTINUED | OUTPATIENT
Start: 2021-04-01 | End: 2021-04-02

## 2021-04-01 RX ORDER — METOPROLOL TARTRATE 1 MG/ML
INJECTION, SOLUTION INTRAVENOUS PRN
Status: DISCONTINUED | OUTPATIENT
Start: 2021-04-01 | End: 2021-04-01

## 2021-04-01 RX ORDER — NALOXONE HYDROCHLORIDE 0.4 MG/ML
0.4 INJECTION, SOLUTION INTRAMUSCULAR; INTRAVENOUS; SUBCUTANEOUS
Status: DISCONTINUED | OUTPATIENT
Start: 2021-04-01 | End: 2021-04-01 | Stop reason: HOSPADM

## 2021-04-01 RX ORDER — HYDROMORPHONE HCL IN WATER/PF 6 MG/30 ML
0.2 PATIENT CONTROLLED ANALGESIA SYRINGE INTRAVENOUS
Status: DISCONTINUED | OUTPATIENT
Start: 2021-04-01 | End: 2021-04-02

## 2021-04-01 RX ORDER — ACETAMINOPHEN 325 MG/1
650 TABLET ORAL EVERY 6 HOURS PRN
Status: DISCONTINUED | OUTPATIENT
Start: 2021-04-01 | End: 2021-04-02

## 2021-04-01 RX ORDER — BUPIVACAINE HYDROCHLORIDE AND EPINEPHRINE 2.5; 5 MG/ML; UG/ML
INJECTION, SOLUTION EPIDURAL; INFILTRATION; INTRACAUDAL; PERINEURAL PRN
Status: DISCONTINUED | OUTPATIENT
Start: 2021-04-01 | End: 2021-04-01 | Stop reason: HOSPADM

## 2021-04-01 RX ORDER — OXYCODONE HYDROCHLORIDE 5 MG/1
5-10 TABLET ORAL EVERY 4 HOURS PRN
Qty: 10 TABLET | Refills: 0 | Status: SHIPPED | OUTPATIENT
Start: 2021-04-01 | End: 2021-04-06

## 2021-04-01 RX ORDER — CEFAZOLIN SODIUM 2 G/100ML
2 INJECTION, SOLUTION INTRAVENOUS
Status: DISCONTINUED | OUTPATIENT
Start: 2021-04-01 | End: 2021-04-02 | Stop reason: HOSPADM

## 2021-04-01 RX ORDER — DEXTROAMPHETAMINE SACCHARATE, AMPHETAMINE ASPARTATE, DEXTROAMPHETAMINE SULFATE AND AMPHETAMINE SULFATE 2.5; 2.5; 2.5; 2.5 MG/1; MG/1; MG/1; MG/1
10 TABLET ORAL DAILY
Status: DISCONTINUED | OUTPATIENT
Start: 2021-04-01 | End: 2021-04-03 | Stop reason: CLARIF

## 2021-04-01 RX ORDER — GLYCOPYRROLATE 0.2 MG/ML
INJECTION, SOLUTION INTRAMUSCULAR; INTRAVENOUS PRN
Status: DISCONTINUED | OUTPATIENT
Start: 2021-04-01 | End: 2021-04-01

## 2021-04-01 RX ORDER — OXYCODONE HYDROCHLORIDE 5 MG/1
5-10 TABLET ORAL
Status: DISCONTINUED | OUTPATIENT
Start: 2021-04-01 | End: 2021-04-02

## 2021-04-01 RX ORDER — ONDANSETRON 2 MG/ML
4 INJECTION INTRAMUSCULAR; INTRAVENOUS EVERY 6 HOURS PRN
Status: DISCONTINUED | OUTPATIENT
Start: 2021-04-01 | End: 2021-04-02

## 2021-04-01 RX ORDER — ONDANSETRON 4 MG/1
4 TABLET, ORALLY DISINTEGRATING ORAL EVERY 30 MIN PRN
Status: DISCONTINUED | OUTPATIENT
Start: 2021-04-01 | End: 2021-04-01 | Stop reason: HOSPADM

## 2021-04-01 RX ORDER — ONDANSETRON 4 MG/1
4 TABLET, FILM COATED ORAL EVERY 8 HOURS PRN
Qty: 9 TABLET | Refills: 0 | Status: SHIPPED | OUTPATIENT
Start: 2021-04-01 | End: 2021-04-06

## 2021-04-01 RX ORDER — IBUPROFEN 600 MG/1
600 TABLET, FILM COATED ORAL EVERY 6 HOURS PRN
Status: DISCONTINUED | OUTPATIENT
Start: 2021-04-01 | End: 2021-04-06 | Stop reason: HOSPADM

## 2021-04-01 RX ORDER — DEXTROAMPHETAMINE SACCHARATE, AMPHETAMINE ASPARTATE, DEXTROAMPHETAMINE SULFATE AND AMPHETAMINE SULFATE 5; 5; 5; 5 MG/1; MG/1; MG/1; MG/1
20 TABLET ORAL EVERY MORNING
Status: DISCONTINUED | OUTPATIENT
Start: 2021-04-02 | End: 2021-04-03 | Stop reason: CLARIF

## 2021-04-01 RX ORDER — CEFAZOLIN SODIUM 2 G/100ML
2 INJECTION, SOLUTION INTRAVENOUS SEE ADMIN INSTRUCTIONS
Status: DISCONTINUED | OUTPATIENT
Start: 2021-04-01 | End: 2021-04-01 | Stop reason: HOSPADM

## 2021-04-01 RX ADMIN — FENTANYL CITRATE 100 MCG: 50 INJECTION, SOLUTION INTRAMUSCULAR; INTRAVENOUS at 10:13

## 2021-04-01 RX ADMIN — GLYCOPYRROLATE 0.2 MG: 0.2 INJECTION, SOLUTION INTRAMUSCULAR; INTRAVENOUS at 23:41

## 2021-04-01 RX ADMIN — SODIUM CHLORIDE: 9 INJECTION, SOLUTION INTRAVENOUS at 18:53

## 2021-04-01 RX ADMIN — NEOSTIGMINE METHYLSULFATE 2.5 MG: 1 INJECTION, SOLUTION INTRAVENOUS at 11:26

## 2021-04-01 RX ADMIN — SODIUM CHLORIDE, POTASSIUM CHLORIDE, SODIUM LACTATE AND CALCIUM CHLORIDE: 600; 310; 30; 20 INJECTION, SOLUTION INTRAVENOUS at 23:55

## 2021-04-01 RX ADMIN — FENTANYL CITRATE 50 MCG: 50 INJECTION, SOLUTION INTRAMUSCULAR; INTRAVENOUS at 10:41

## 2021-04-01 RX ADMIN — PROPOFOL 150 MG: 10 INJECTION, EMULSION INTRAVENOUS at 23:55

## 2021-04-01 RX ADMIN — CEFAZOLIN SODIUM 2 G: 2 INJECTION, SOLUTION INTRAVENOUS at 23:57

## 2021-04-01 RX ADMIN — ROCURONIUM BROMIDE 50 MG: 10 INJECTION INTRAVENOUS at 10:13

## 2021-04-01 RX ADMIN — ONDANSETRON HYDROCHLORIDE 4 MG: 2 INJECTION, SOLUTION INTRAVENOUS at 11:21

## 2021-04-01 RX ADMIN — SODIUM CHLORIDE, POTASSIUM CHLORIDE, SODIUM LACTATE AND CALCIUM CHLORIDE: 600; 310; 30; 20 INJECTION, SOLUTION INTRAVENOUS at 10:57

## 2021-04-01 RX ADMIN — ACETAMINOPHEN 650 MG: 325 TABLET, FILM COATED ORAL at 21:38

## 2021-04-01 RX ADMIN — SODIUM CHLORIDE, POTASSIUM CHLORIDE, SODIUM LACTATE AND CALCIUM CHLORIDE: 600; 310; 30; 20 INJECTION, SOLUTION INTRAVENOUS at 09:50

## 2021-04-01 RX ADMIN — IBUPROFEN 600 MG: 600 TABLET ORAL at 18:20

## 2021-04-01 RX ADMIN — SODIUM CHLORIDE 1000 ML: 9 INJECTION, SOLUTION INTRAVENOUS at 17:52

## 2021-04-01 RX ADMIN — MIDAZOLAM 2 MG: 1 INJECTION INTRAMUSCULAR; INTRAVENOUS at 10:05

## 2021-04-01 RX ADMIN — ACETAMINOPHEN 975 MG: 325 TABLET, FILM COATED ORAL at 12:55

## 2021-04-01 RX ADMIN — LIDOCAINE HYDROCHLORIDE 30 MG: 10 INJECTION, SOLUTION INFILTRATION; PERINEURAL at 10:13

## 2021-04-01 RX ADMIN — ONDANSETRON 4 MG: 2 INJECTION INTRAMUSCULAR; INTRAVENOUS at 21:24

## 2021-04-01 RX ADMIN — ROCURONIUM BROMIDE 30 MG: 10 INJECTION INTRAVENOUS at 23:55

## 2021-04-01 RX ADMIN — FENTANYL CITRATE 50 MCG: 50 INJECTION, SOLUTION INTRAMUSCULAR; INTRAVENOUS at 10:46

## 2021-04-01 RX ADMIN — FENTANYL CITRATE 50 MCG: 50 INJECTION, SOLUTION INTRAMUSCULAR; INTRAVENOUS at 12:16

## 2021-04-01 RX ADMIN — IOPAMIDOL 65 ML: 755 INJECTION, SOLUTION INTRAVENOUS at 23:03

## 2021-04-01 RX ADMIN — DEXAMETHASONE SODIUM PHOSPHATE 4 MG: 4 INJECTION, SOLUTION INTRA-ARTICULAR; INTRALESIONAL; INTRAMUSCULAR; INTRAVENOUS; SOFT TISSUE at 10:13

## 2021-04-01 RX ADMIN — GLYCOPYRROLATE 0.2 MG: 0.2 INJECTION, SOLUTION INTRAMUSCULAR; INTRAVENOUS at 10:13

## 2021-04-01 RX ADMIN — KETOROLAC TROMETHAMINE 30 MG: 30 INJECTION, SOLUTION INTRAMUSCULAR at 12:13

## 2021-04-01 RX ADMIN — SODIUM CHLORIDE 500 ML: 9 INJECTION, SOLUTION INTRAVENOUS at 21:15

## 2021-04-01 RX ADMIN — SIMETHICONE 80 MG: 80 TABLET, CHEWABLE ORAL at 20:08

## 2021-04-01 RX ADMIN — PROPOFOL 200 MG: 10 INJECTION, EMULSION INTRAVENOUS at 10:13

## 2021-04-01 RX ADMIN — METOPROLOL TARTRATE 1 MG: 5 INJECTION INTRAVENOUS at 11:15

## 2021-04-01 RX ADMIN — GLYCOPYRROLATE 0.2 MG: 0.2 INJECTION, SOLUTION INTRAMUSCULAR; INTRAVENOUS at 11:25

## 2021-04-01 RX ADMIN — MIDAZOLAM 1 MG: 1 INJECTION INTRAMUSCULAR; INTRAVENOUS at 23:53

## 2021-04-01 RX ADMIN — LIDOCAINE HYDROCHLORIDE 50 MG: 10 INJECTION, SOLUTION INFILTRATION; PERINEURAL at 23:55

## 2021-04-01 RX ADMIN — PROPOFOL 40 MCG/KG/MIN: 10 INJECTION, EMULSION INTRAVENOUS at 10:16

## 2021-04-01 RX ADMIN — CEFAZOLIN SODIUM 2 G: 2 INJECTION, SOLUTION INTRAVENOUS at 10:05

## 2021-04-01 ASSESSMENT — MIFFLIN-ST. JEOR: SCORE: 1271.89

## 2021-04-01 NOTE — OP NOTE
General Surgery Operative Note    PREOPERATIVE DIAGNOSIS:  Chronic cholecystitis [K81.1]    POSTOPERATIVE DIAGNOSIS:  Same, adhesions of liver to the diaphragm    PROCEDURE:  Laparoscopic Cholecystectomy with fluoroscopic intra-operative cholangiogram    ANESTHESIA:  General.    PREOPERATIVE MEDICATIONS:  Ancef IV.    SURGEON:  Heath Cutler MD    ASSISTANT:  Marilu Hansen PA-C The physicians assistant was medically necessary for their expertise in camera management, suctioning, suturing, and retraction.                            ESTIMATED BLOOD LOSS:  5cc's    INDICATIONS:  Jaymie Morales is a 40 year old female who has been experiencing episodes of epigastric and RUQ abdominal pain.  Abdominal imaging has revealed a dramatically reduced gallbladder ejection fraction.  She now presents for laparoscopic cholecystectomy after having risks and benefits reviewed in detail.  We specifically discussed the possibility that her symptoms will not be relieved with cholecystectomy, as she has no gallstones on ultrasound.      PROCEDURE:  The patient was brought to the operating room, placed supine on the table and after induction of anesthetic, the abdomen was prepped and draped in sterile manner.  Pause was performed.  An incision was made above the umbilicus and extended down to the midline fascia which was then opened.  A Tanya trocar was inserted.  Gas was insufflated.  The laparoscope was advanced.  There was no evidence or underlying bowel or tissue injury.  Secondary trocars were placed under laparoscopic guidance.  The gallbladder was grasped and retracted cephalad.  The infundibulum was grasped and retracted laterally.  The region of the cystic duct was stripped of its peritoneal and fatty coverings and followed toward its confluence with the common hepatic and common bile ducts.  The cystic artery was caudal to the cystic duct suggesting a replaced right hepatic artery.  The region behind the  infundibulum was also dissected.  A fluoroscopic cholangiogram was obtained with the Kelley catheter and clamp.  This showed normal anatomy, no filling defects to suggest stones and good drainage into the duodenum.  On the film, I question the possibility that there may be a aberrant right posterior sectoral duct entering the cystic duct near its junction with the common hepatic duct.  Radiology reviewed the films and felt the anatomy was normal.  The cholangiogram catheter was removed.  The gallbladder was then clipped at the infundibulum and 2 clips were placed on the cystic duct a generous distance from its confluence with the common hepatic and common bile ducts.  I also made certain to place the clips far from the junction of the cystic and common hepatic duct to avoid any potential for closure of the above-mentioned possible aberrant duct.  The cystic duct was then cut from the gallbladder.  Similarly, the cystic artery was triply clipped and divided after completion of the cholangiogram.  The gallbladder was then removed from its hepatic fossa with electrocautery.  A small artery was identified coursing up the gallbladder fossa, this was clipped near the apex of the gallbladder fossa after first controlling with cautery.  Once the gallbladder was removed from its fossa, it was placed in an Endocatch pouch and removed from the abdomen through the supraumbilical incision without spillage of bile or stones.  Marcaine was placed at each of the trocar sites and they were sequentially removed and viewed from within to be sure no bleeding was present and none was seen.  The final trocar was then removed after venting as much gas as possible from the abdomen and the fascia was specifically closed with 0 Vicryl sutures.  Subcuticular skin closure was performed followed by placement of Steri-Strips and dressings and she was returned to the recovery room in excellent condition with all sponge and needle counts correct,  having tolerated the procedure well.    INTRAOPERATIVE FINDINGS: Normal-appearing liver surface, normal anterior stomach and anterior duodenum, normal terminal ileum and appendix, no evidence for inguinal hernia, limited evaluation of the pelvis showed no unusual fluid, normal ovaries and uterine fundus.    Specimens:   ID Type Source Tests Collected by Time Destination   A : GALLBLADDER AND CONTENTS Tissue Gallbladder and Contents SURGICAL PATHOLOGY EXAM Heath Cutler MD 4/1/2021 11:24 AM        Heath Cutler MD

## 2021-04-01 NOTE — PROGRESS NOTES
"While in PACU, pt felt suddenly nauseous, became bradycardic and \"passed out\". Reported to be minimally responsive for a minute or two. No unusual motor activity noted.   Discussing with patient and , these episodes have happened many times before and some have suggested possible seizures. With continued observation, her HR has increased to 60's and BP came up.     In discussion with pt and , they agree with admission to allow more time for recovery and hopefully find a cause for these episodes. Discussed with hospitalist, they will also manage DM1. Will admit to monitored bed.    Heath Cutler MD  4/1/2021 5:34 PM    "

## 2021-04-01 NOTE — OR NURSING
Pt in discharge area post lap brunilda with spouse in room.  Pt up to void, upon arrival back to room pt states she felt nauseous and appeared pale.  Pt reclined back to supine position in chair, pt then lost consciousness.  HR 38-42 via pulse O2 probe, BP 80/49,  via insulin pump, pt awoke after a couple minutes. Pt's eyes are open but pt not responding for a few minutes, pt then began responding and talking.  Dr. Hale in room to assess pt when she regained consicousness, 12lead EKG done and pt's BP up to 95/52, HR 50.  Spouse states pt has done this before and they were advised it may be seizure activity.  Dr. Hale advised pt to be admitted overnight, Dr. Cutler called and notified of episode, order to draw labs.  Hospitalist consult in [facility],  Pt will transfer to floor overnight.

## 2021-04-01 NOTE — ANESTHESIA PROCEDURE NOTES
Airway       Patient location during procedure: OR  Staff -        CRNA: Shahana Key APRN CRNA       Performed By: CRNA  Consent for Airway        Urgency: elective  Indications and Patient Condition       Indications for airway management: jennifer-procedural       Induction type:intravenous       Mask difficulty assessment: 1 - vent by mask    Final Airway Details       Final airway type: endotracheal airway       Successful airway: ETT - single and Oral  Endotracheal Airway Details        ETT size (mm): 7.0       Cuffed: yes       Successful intubation technique: direct laryngoscopy       DL Blade Type: Moser 2       Grade View of Cords: 2 (anterior)       Adjucts: stylet       Position: Right       Measured from: gums/teeth       Secured at (cm): 22       Bite block used: Soft    Post intubation assessment        Placement verified by: capnometry, equal breath sounds and chest rise        Number of attempts at approach: 1       Number of other approaches attempted: 0       Secured with: plastic tape       Ease of procedure: easy       Dentition: Intact and Unchanged

## 2021-04-01 NOTE — CONSULTS
"Hospitalist Consultation      Jaymie Morales MRN# [id number]   YOB: 1980 Age: 40 year old   Date of Admission: 4/1/2021     Requesting Physician:  Dr. Cutler  Reason for consult: Medical management           Assessment and Plan:   This patient is a 40 year old female with a PMH significant for DM I with insulin pump, ADD and previous episodes of syncope who is POD 0 s/p cholecystectomy for chronic cholecystitis. We were asked for stat consult due to episode of passing out with bradycardia and hypotension.    # S/p cholecystectomy due to chronic cholecystitis  Patient had routine cholecystectomy performed as an outpatient today due to chronic cholecystitis symptoms.  This was an uncomplicated procedure however postoperatively she had a syncopal episode which will be discussed below.  -Control as ordered by surgery  -PCDs ordered    #Loss of consciousness suspect syncope versus seizure disorder  She describes at least 100 episodes of loss of consciousness since she was a kid.  He describes presyncopal symptoms including an \"aura\" that warrants her to lay down but sometimes she is not quick enough.  When she was younger she would recover quickly from these episodes but with the most recent episodes she has had symptoms that sound possibly post ictal with difficulty speaking, fatigue and brain fogginess.  After one episode she also recalls having hypertension and tachycardia for at least a week.  She had 2 of these episodes so far today and blood pressure was as low as 80/49 with heart rate in the 38-42 range.  Are asked to observe her overnight due to this.  I did observe 1 of these episodes and there was no seizure-like activity glucose in the 140 range.  Lab work following this episode shows a normal lactic acid and hemoglobin drop from the 14 range to 9.8.  EKG showed a sinus bradycardia.   -Telemetry  -Echocardiogram  -CT head  -EEG to assess for seizure like activity  -Normal lactic acid is " "reassuring for non seizure etiology  -Recheck Hgb in 4 hours given this was slightly low  -Fluid support as ordered by surgery  -Neurology consult    #DM I  No recent A1c but patient seems pretty well controlled on pump  -She may continue to use pump    #ADD  -Continue Adderall            DVT Prophylaxis: on PCDs  D/C planning: To home once able             History of Present Illness:   She reports for the past couple of days she has been struggling with abdominal pain related to her gallbladder.  Today following the procedure she developed lightheadedness, nausea and a \"aura\" that on further clarification she describes as the room closing in before her loss of consciousness.  Since the episode she has had a frontal headache which is not usual after her  episodes.  She had another episode of loss of consciousness when she was in the room on the observation unit.  She reports that she has had 100s of episodes throughout her lifetime but the episodes most recently she describes brain fogginess, difficulty speaking and fatigue following the episodes.  She has been told that these may be seizures but is yet to have a work-up for this.  She generally feels them coming on and just lies down hoping that the symptoms will pass.  Currently she only has abdominal pain but wants to eat and drink.                Past Medical History:     Past Medical History:   Diagnosis Date     Complication of anesthesia     Slow to wake after knee surgery     Diabetes (H)     Type 1               Past Surgical History:     Past Surgical History:   Procedure Laterality Date     ABDOMEN SURGERY       ARTHROSCOPY KNEE Right                  Social History:     Social History     Tobacco Use     Smoking status: Never Smoker     Smokeless tobacco: Never Used   Substance Use Topics     Alcohol use: Yes     Alcohol/week: 0.0 standard drinks     Comment: Socially     Drug use: Yes     Types: Marijuana     Comment: occasional                 Family " History:   Family Hx fully reviewed and is non contributory to this admission.             Allergies:     Allergies   Allergen Reactions     Sulfa Drugs Unknown and Hives     Sulfonamide Derivatives      Sulfa (Sulfonamide Antibiotics)             Medications:     Prior to Admission medications    Medication Sig Last Dose Taking? Auth Provider   amphetamine-dextroamphetamine (ADDERALL) 10 MG tablet Take 10 mg by mouth daily Takes at Noon 3/31/2021 at Unknown time Yes Reported, Patient   amphetamine-dextroamphetamine (ADDERALL) 20 MG tablet Take 20 mg by mouth every morning 3/31/2021 at Unknown time Yes Reported, Patient   INSULIN PUMP - OUTPATIENT Date last updated:  14  Medtronic Minimed: Model 551  BASAL RATES and times:  12   AM (midnight): 0.3 units/hour    7     AM: 0.1 units/hour   8    PM: 0.25 units/hour   Basal Pattern A:  Jaymie CHLOE Morales will use when N/A.  CARB RATIO and times:  12   AM (midnight): 12  Corection Factor (Sensitivity) and times:  12   AM (midnight): 95 mg/dL  BLOOD GLUCOSE TARGET and times:  12   AM (midnight): 90 - 120  6     AM:  80 - 110  9    PM:  90 - 120  Active Insulin Time:  4 hours  Sensor:  Yes: 12   AM (midnight): 65 - 200  Carelink / Diasend username:  [email or web address]  Carelink / Diasend Password:  [email or web address]  Yes Reported, Patient   ondansetron (ZOFRAN) 4 MG tablet Take 1 tablet (4 mg) by mouth every 8 hours as needed for nausea  Yes Heath Cutler MD   oxyCODONE (ROXICODONE) 5 MG tablet Take 1-2 tablets (5-10 mg) by mouth every 4 hours as needed for moderate to severe pain  Yes Heath Cutler MD   Prenatal Multivit-Min-Fe-FA (PRE- FORMULA PO) chewable  Yes Reported, Patient   acetone, Urine, test (KETOSTIX) STRP    Reported, Patient   glucagon (GLUCAGON EMERGENCY) 1 MG injection 1 mg once   Reported, Patient   insulin aspart (NovoLOG) injection Use with Insulin Pump   Reported, Patient               Review of Systems:     A comprehensive greater than 10 system review  "of systems was carried out.  Pertinent positives and negatives are noted above.  Otherwise negative for contributory info.            Physical Exam:   Vitals were reviewed  Blood pressure 108/69, pulse (!) 42, temperature 97.9  F (36.6  C), temperature source Temporal, resp. rate 12, height 1.676 m (5' 6\"), weight 58.5 kg (129 lb), last menstrual period 03/25/2021, SpO2 98 %.  Exam:    GENERAL:  Comfortable.  PSYCH: pleasant, oriented, No acute distress.  HEENT:  PERRLA. Normal conjunctiva, normal hearing, nasal mucosa and Oropharynx are normal.  NECK:  Supple, no neck vein distention, adenopathy or bruits, normal thyroid.  HEART:  Normal S1, S2 with no murmur, no pericardial rub, S3 or S4.  LUNGS:  Clear to auscultation, normal Respiratory effort.  ABDOMEN:  Soft, no hepatosplenomegaly, normal bowel sounds. Tender to palpation.  EXTREMITIES:  No pedal edema, +2 pulses bilateral and equal.  SKIN:  Dry to touch, No rash, wound or ulcerations.  NEUROLOGIC:  Nonfocal with normal cranial nerve and motor power and sensation.            Data:   Past 24 hours labs, studies, and imaging were reviewed.  Recent Labs   Lab 04/01/21 1803 04/01/21  1534   WBC  --  9.3   HGB 9.8* 11.5*   HCT  --  34.1*   MCV  --  96   PLT  --  235     Recent Labs   Lab 04/01/21  1803   LACT 1.7       Goldie Perkins PA-C        "

## 2021-04-01 NOTE — ANESTHESIA PREPROCEDURE EVALUATION
Anesthesia Pre-Procedure Evaluation    Patient: Jaymie Morales   MRN: [id number] : 1980        Preoperative Diagnosis: Chronic cholecystitis [K81.1]   Procedure : Procedure(s):  LAPAROSCOPIC CHOLECYSTECTOMY WITH CHOLANGIOGRAMS     Past Medical History:   Diagnosis Date     Complication of anesthesia     Slow to wake after knee surgery     Diabetes (H)     Type 1      Past Surgical History:   Procedure Laterality Date     ABDOMEN SURGERY       ARTHROSCOPY KNEE Right       Allergies   Allergen Reactions     Sulfa Drugs Unknown and Hives     Sulfonamide Derivatives      Sulfa (Sulfonamide Antibiotics)      Social History     Tobacco Use     Smoking status: Never Smoker     Smokeless tobacco: Never Used   Substance Use Topics     Alcohol use: Yes     Alcohol/week: 0.0 standard drinks     Comment: Socially      Wt Readings from Last 1 Encounters:   21 58.5 kg (129 lb)        Anesthesia Evaluation   Pt has had prior anesthetic. Type: General.    No history of anesthetic complications       ROS/MED HX  ENT/Pulmonary:  - neg pulmonary ROS     Neurologic:  - neg neurologic ROS     Cardiovascular:  - neg cardiovascular ROS     METS/Exercise Tolerance:     Hematologic:  - neg hematologic  ROS     Musculoskeletal:  - neg musculoskeletal ROS     GI/Hepatic:     (+) cholecystitis/cholelithiasis,     Renal/Genitourinary:  - neg Renal ROS     Endo:     (+) type I DM, Using insulin,     Psychiatric/Substance Use:  - neg psychiatric ROS     Infectious Disease:  - neg infectious disease ROS     Malignancy:       Other:            Physical Exam    Airway        Mallampati: II   TM distance: > 3 FB   Neck ROM: full   Mouth opening: > 3 cm    Respiratory Devices and Support         Dental  no notable dental history         Cardiovascular   cardiovascular exam normal          Pulmonary   pulmonary exam normal                OUTSIDE LABS:  CBC:   Lab Results   Component Value Date    WBC 5.2 10/02/2020    HGB 13.6  10/02/2020    HGB 10.0 (L) 12/25/2013    HCT 38.1 10/02/2020    HCT 27.5 (L) 12/25/2013     10/02/2020     12/25/2013     BMP:   Lab Results   Component Value Date     10/02/2020     12/24/2013    POTASSIUM 3.7 10/02/2020    POTASSIUM 3.0 (L) 12/24/2013    CHLORIDE 103 10/02/2020    CHLORIDE 104 12/24/2013    CO2 27 10/02/2020    CO2 25 12/24/2013    BUN 15 10/02/2020    BUN 12 12/24/2013    CR 0.80 10/02/2020    CR 0.73 12/24/2013     (H) 10/02/2020    GLC 85 12/24/2013     COAGS: No results found for: PTT, INR, FIBR  POC:   Lab Results   Component Value Date    HCGS Negative 10/02/2020     HEPATIC:   Lab Results   Component Value Date    ALBUMIN 3.4 (L) 12/24/2013    PROTTOTAL 6.6 12/24/2013     OTHER:   Lab Results   Component Value Date    A1C 6.9 (A) 09/19/2015    JOSE A 9.3 10/02/2020    MAG 2.1 10/02/2020    TSH 0.55 10/02/2020    CRP <2.9 10/02/2020    SED 6 10/02/2020       Anesthesia Plan    ASA Status:  2      Anesthesia Type: General.     - Airway: ETT   Induction: Intravenous.   Maintenance: Balanced (Precedex, ketamine. Pt. would like to minimize opiates. Also, propofol gtt).        Consents         - Extended Intubation/Ventilatory Support Discussed: No.      - Patient is DNR/DNI Status: No    Use of blood products discussed: No .     Postoperative Care    Pain management: IV analgesics, Oral pain medications.   PONV prophylaxis: Ondansetron (or other 5HT-3), Dexamethasone or Solumedrol, Background Propofol Infusion     Comments:                Sanju Thompson MD

## 2021-04-01 NOTE — ANESTHESIA CARE TRANSFER NOTE
Patient: Jaymie Morales    Procedure(s):  LAPAROSCOPIC CHOLECYSTECTOMY WITH CHOLANGIOGRAMS    Diagnosis: Chronic cholecystitis [K81.1]  Diagnosis Additional Information: No value filed.    Anesthesia Type:   General     Note:    Oropharynx: oropharynx clear of all foreign objects  Level of Consciousness: drowsy  Oxygen Supplementation: face mask  Level of Supplemental Oxygen (L/min / FiO2): 4  Independent Airway: airway patency satisfactory and stable  Dentition: dentition unchanged  Vital Signs Stable: post-procedure vital signs reviewed and stable  Report to RN Given: handoff report given  Patient transferred to: PACU    Handoff Report: Identifed the Patient, Identified the Reponsible Provider, Reviewed the pertinent medical history, Discussed the surgical course, Reviewed Intra-OP anesthesia mangement and issues during anesthesia, Set expectations for post-procedure period and Allowed opportunity for questions and acknowledgement of understanding      Vitals: (Last set prior to Anesthesia Care Transfer)  CRNA VITALS  4/1/2021 1118 - 4/1/2021 1155      4/1/2021             EKG:  Sinus tachycardia        Electronically Signed By: KATHERINE Chopra CRNA  April 1, 2021  11:55 AM

## 2021-04-01 NOTE — DISCHARGE INSTRUCTIONS
HOME CARE FOLLOWING LAPAROSCOPIC CHOLECYSTECTOMY  AFIA Samson, HAILE Mota R. O Donnell, J. Shaheen    INCISIONAL CARE:  Replace the bandage over your incisions DAILY until all drainage stops, or if more comfortable to have in place.  If present, leave the steri-strips (white paper tapes) in place for 14 days after surgery.     BATHING:  OK to shower 48 hours after surgery.  Avoid baths for 1 week after surgery.  You may wash your hair at any time.  Gently pat your incision dry after bathing.  Do not apply lotions, creams, or ointments to incisions.    ACTIVITY:  Light Activity -- you may immediately be up and about as tolerated.  Walking is encouraged, increase as tolerated.  Driving/Light Work-- when comfortable and off narcotic pain medications.  Strenuous Work/Activity -- limit lifting to 20 pounds for 2 weeks.  Progressively increase with time.  Active Sports (running, biking, etc.) -- cautiously resume after 2 weeks.    DISCOMFORT:  Local anesthetic placed at surgery should provide relief for 4-8 hours.  Begin taking pain pills before discomfort is severe.  Take the pain medication with some food, when possible, to minimize side effects.  Intermittent use of ice packs may help during the first 1-3 weeks after surgery.  Expect gradual improvement.    Over-the-counter anti-inflammatory medications (i.e. Ibuprofen/Advil/Motrin or Naprosyn/Aleve) may be used per package instructions in addition to or while tapering off the narcotic pain medications to decrease swelling and sensitivity.  DO NOT TAKE these Anti-inflammatory medications if your primary physician has advised against doing so, or if you have acid reflux, ulcer, or bleeding disorder, or take blood-thinner medications.  Call your primary physician or the surgery office if you have medication questions.    After laparoscopic cholecystectomy, you may have shoulder or upper back discomfort due to the gas used during surgery.   This is temporary and should resolve within 2-3 days.  Frequent short walks may help with this.  You may have decreased energy level for 1-2 weeks after surgery related to your recovery.    DIET:  Start with liquids and gradually increase diet as tolerated.  Drink plenty of fluids.  While taking pain medications, consider use of a stool softener, increase your fiber in your diet, or add a fiber supplement (like Metamucil, Citrucel) to help prevent constipation - a possible side effect of pain medications.  It is not uncommon to experience some bowel changes (loose stools or constipation) after surgery.  Your body has to adapt to you no longer having a gall bladder.  To help minimize this side effect, avoid fatty foods for 1-2 weeks after surgery.  You may then slowly increase the amount of fatty foods in your diet.      NAUSEA:  If nauseated from the anesthetic/pain meds; rest in bed, get up cautiously with assistance, and drink clear liquids (juice, tea, broth).    FOLLOW-UP AFTER SURGERY:  -Our office will contact you approximately 2-3 weeks after surgery to check on your progress and answer any questions you may have.  If you are doing well, you will not need to return for an office appointment.  If any concerns are identified over the phone, we will help you make an appointment to see a provider.    -If you have not received a phone call, have any questions or concerns, or would like to be seen, please call us at [phone].  We are located at: [address], [address]; [place] [address]    -CONTACT US IF THE FOLLOWING DEVELOPS:   1. A fever that is above 101     2. Increased redness, warmth, drainage, bleeding, or swelling.   3. Pain that is not relieved by rest/ice and your prescription.   4.  Increasing pain after 48 hours.   5. Drainage that is thick, cloudy, yellow, green or white.   6. Any other questions or concerns.      FREQUENTLY ASKED QUESTIONS:    Q:  How should my incision look?    A:   Normally your incision will appear slightly swollen with light redness directly along the incision itself as it heals.  It may feel like a bump or ridge as the healing/scarring happens, and over time (3-4 months) this bump or ridge feeling should slowly go away.  In general, clear or pink watery drainage can be normal at first as your incision heals, but should decrease over time.    Q:  How do I know if my incision is infected?  A:  Look at your incision for signs of infection, like redness around the incision spreading to surrounding skin, or drainage of cloudy or foul-smelling drainage.  If you feel warm, check your temperature to see if you are running a fever.    **If any of these things occur, please notify the nurse at our office.  We may need you to come into the office for an incision check.      Q:  How do I take care of my incision?  A:  If you have a dressing in place - Starting the day after surgery, replace the dressing 1-2 times a day until there is no further drainage from the incision.  At that time, a dressing is no longer needed.  Try to minimize tape on the skin if irritation is occurring at the tape sites.  If you have significant irritation from tape on the skin, please call the office to discuss other method of dressing your incision.    Small pieces of tape called  steri-strips  may be present directly overlying your incision; these may be removed 10 days after surgery unless otherwise specified by your surgeon.  If these tapes start to loosen at the ends, you may trim them back until they fall off or are removed.      Q:  There is a piece of tape or a sticky  lead  still on my skin.  Can I remove this?  A:  Sometimes the sticky  leads  used for monitoring during surgery or for evaluation in the emergency department are not all removed while you are in the hospital.  These sometimes have a tab or metal dot on them.  You can easily remove these on your own, like taking off a band-aid.  If  there is a gel substance under the  lead , simply wipe/clean it off with a washcloth or paper towel.      Q:  What can I do to minimize constipation (very hard stools, or lack of stools)?  A:  Stay well hydrated.  Increase your dietary fiber intake or take a fiber supplement -with plenty of water.  Walk around frequently.  You may consider an over-the-counter stool-softener.  Your Pharmacist can assist you with choosing one that is stocked at your pharmacy.  Constipation is also one of the most common side effects of pain medication.  If you are using pain medication, be pro-active and try to PREVENT problems with constipation by taking the steps above BEFORE constipation becomes a problem.    Q:  What do I do if I need more pain medications?  A:  Call the office to receive refills.  Be aware that certain pain meds cannot be called into a pharmacy and actually require a paper prescription.  A change may be made in your pain med as you progress thru your recovery period or if you have side effects to certain meds.    --Pain meds are NOT refilled after 5pm on weekdays, and NOT AT ALL on the weekends, so please look ahead to prevent problems.      Q:  Why am I having a hard time sleeping now that I am at home?  A:  Many medications you receive while you are in the hospital can impact your sleep for a number of days after your surgery/hospitalization.  Decreased level of activity and naps during the day may also make sleeping at night difficult.  Try to minimize day-time naps, and get up frequently during the day to walk around your home during your recovery time.  Sleep aides may be of some help, but are not recommended for long-term use.      Q:  I am having some back discomfort.  What should I do?  A:  This may be related to certain positioning that was required for your surgery, extended periods of time in bed, or other changes in your overall activity level.  You may try ice, heat, acetaminophen, or ibuprofen to  treat this temporarily.  Note that many pain medications have acetaminophen in them and would state this on the prescription bottle.  Be sure not to exceed the maximum of 4000mg per day of acetaminophen.     **If the pain you are having does not resolve, is severe, or is a flare of back pain you have had on other occasions prior to surgery, please contact your primary physician for further recommendations or for an appointment to be examined at their office.    Q:  Why am I having headaches?  A:  Headaches can be caused by many things:  caffeine withdrawal, use of pain meds, dehydration, high blood pressure, lack of sleep, over-activity/exhaustion, flare-up of usual migraine headaches.  If you feel this is related to muscle tension (a band-like feeling around the head, or a pressure at the low-back of the head) you may try ice or heat to this area.  You may need to drink more fluids (try electrolyte drink like Gatorade), rest, or take your usual migraine medications.   **If your headaches do not resolve, worsen, are accompanied by other symptoms, or if your blood pressure is high, please call your primary physician for recommendation and/or examination.    Q:  I am unable to urinate.  What do I do?  A:  A small percentage of people can have difficulty urinating initially after surgery.  This includes being able to urinate only a very small amount at a time and feeling discomfort or pressure in the very low abdomen.  This is called  urinary retention , and is actually an urgent situation.  Proceed to your nearest Emergency department for evaluation (not an Urgent Care Center).  Sometimes the bladder does not work correctly after certain medications you receive during surgery, or related to certain procedures.  You may need to have a catheter placed until your bladder recovers.  When planning to go to an Emergency department, it may help to call the ER to let them know you are coming in for this problem after a  surgery.  This may help you get in quicker to be evaluated.  **If you have symptoms of a urinary tract infection, please contact your primary physician for the proper evaluation and treatment.          If you have other questions, please call the office Monday thru Friday between 8am and 5pm to discuss with the nurse or physician assistant.  #[phone]    There is a surgeon ON CALL on weekday evenings and over the weekend in case of urgent need only, and may be contacted at the same number.    If you are having an emergency, call 911 or proceed to your nearest emergency department.      GENERAL ANESTHESIA OR SEDATION ADULT DISCHARGE INSTRUCTIONS   SPECIAL PRECAUTIONS FOR 24 HOURS AFTER SURGERY    IT IS NOT UNUSUAL TO FEEL LIGHT-HEADED OR FAINT, UP TO 24 HOURS AFTER SURGERY OR WHILE TAKING PAIN MEDICATION.  IF YOU HAVE THESE SYMPTOMS; SIT FOR A FEW MINUTES BEFORE STANDING AND HAVE SOMEONE ASSIST YOU WHEN YOU GET UP TO WALK OR USE THE BATHROOM.    YOU SHOULD REST AND RELAX FOR THE NEXT 24 HOURS AND YOU MUST MAKE ARRANGEMENTS TO HAVE SOMEONE STAY WITH YOU FOR AT LEAST 24 HOURS AFTER YOUR DISCHARGE.  AVOID HAZARDOUS AND STRENUOUS ACTIVITIES.  DO NOT MAKE IMPORTANT DECISIONS FOR 24 HOURS.    DO NOT DRIVE ANY VEHICLE OR OPERATE MECHANICAL EQUIPMENT FOR 24 HOURS FOLLOWING THE END OF YOUR SURGERY.  EVEN THOUGH YOU MAY FEEL NORMAL, YOUR REACTIONS MAY BE AFFECTED BY THE MEDICATION YOU HAVE RECEIVED.    DO NOT DRINK ALCOHOLIC BEVERAGES FOR 24 HOURS FOLLOWING YOUR SURGERY.    DRINK CLEAR LIQUIDS (APPLE JUICE, GINGER ALE, 7-UP, BROTH, ETC.).  PROGRESS TO YOUR REGULAR DIET AS YOU FEEL ABLE.    YOU MAY HAVE A DRY MOUTH, A SORE THROAT, MUSCLES ACHES OR TROUBLE SLEEPING.  THESE SHOULD GO AWAY AFTER 24 HOURS.    CALL YOUR DOCTOR FOR ANY OF THE FOLLOWING:  SIGNS OF INFECTION (FEVER, GROWING TENDERNESS AT THE SURGERY SITE, A LARGE AMOUNT OF DRAINAGE OR BLEEDING, SEVERE PAIN, FOUL-SMELLING DRAINAGE, REDNESS OR SWELLING.    IT HAS  BEEN OVER 8 TO 10 HOURS SINCE SURGERY AND YOU ARE STILL NOT ABLE TO URINATE (PASS WATER).     Maximum acetaminophen (Tylenol) dose from all sources should not exceed 4 grams (4000 mg) per day.  You received 975 mg at 12:55, your next dose is available anytime after 6 pm.    You received Toradol, an IV form of ibuprofen (Motrin) at 12:15 pm.  Do not take any ibuprofen products until 6:15 pm.

## 2021-04-01 NOTE — ANESTHESIA POSTPROCEDURE EVALUATION
Patient: Jaymie Morales    Procedure(s):  LAPAROSCOPIC CHOLECYSTECTOMY WITH CHOLANGIOGRAMS    Diagnosis:Chronic cholecystitis [K81.1]  Diagnosis Additional Information: No value filed.    Anesthesia Type:  General    Note:  Disposition: Outpatient   Postop Pain Control: Uneventful            Sign Out: Well controlled pain   PONV: No   Neuro/Psych: Uneventful            Sign Out: Acceptable/Baseline neuro status   Airway/Respiratory: Uneventful            Sign Out: Acceptable/Baseline resp. status   CV/Hemodynamics: Uneventful            Sign Out: Acceptable CV status   Other NRE: NONE   DID A NON-ROUTINE EVENT OCCUR? No         Last vitals:  Vitals:    04/01/21 1450 04/01/21 1500 04/01/21 1515   BP: (!) 80/49 95/52 118/76   Pulse: (!) 42     Resp: (!) 6 8 10   Temp:      SpO2: 100% 100% 100%       Last vitals prior to Anesthesia Care Transfer:  CRNA VITALS  4/1/2021 1118 - 4/1/2021 1218      4/1/2021             EKG:  Sinus tachycardia          Electronically Signed By: Sanju Thompson MD  April 1, 2021  3:31 PM

## 2021-04-02 LAB
ABO + RH BLD: NORMAL
ABO + RH BLD: NORMAL
BASOPHILS # BLD AUTO: 0 10E9/L (ref 0–0.2)
BASOPHILS NFR BLD AUTO: 0.3 %
BLD GP AB SCN SERPL QL: NORMAL
BLD PROD TYP BPU: NORMAL
BLD PROD TYP BPU: NORMAL
BLD UNIT ID BPU: 0
BLOOD BANK CMNT PATIENT-IMP: NORMAL
BLOOD PRODUCT CODE: NORMAL
BPU ID: NORMAL
COPATH REPORT: NORMAL
DIFFERENTIAL METHOD BLD: ABNORMAL
EOSINOPHIL # BLD AUTO: 0 10E9/L (ref 0–0.7)
EOSINOPHIL NFR BLD AUTO: 0 %
ERYTHROCYTE [DISTWIDTH] IN BLOOD BY AUTOMATED COUNT: 12.2 % (ref 10–15)
GLUCOSE BLDC GLUCOMTR-MCNC: 148 MG/DL (ref 70–99)
GLUCOSE BLDC GLUCOMTR-MCNC: 168 MG/DL (ref 70–99)
GLUCOSE BLDC GLUCOMTR-MCNC: 210 MG/DL (ref 70–99)
GLUCOSE BLDC GLUCOMTR-MCNC: 213 MG/DL (ref 70–99)
GLUCOSE BLDC GLUCOMTR-MCNC: 275 MG/DL (ref 70–99)
GLUCOSE SERPL-MCNC: 280 MG/DL (ref 70–99)
HCT VFR BLD AUTO: 20.1 % (ref 35–47)
HGB BLD-MCNC: 7 G/DL (ref 11.7–15.7)
HGB BLD-MCNC: 7.1 G/DL (ref 11.7–15.7)
HGB BLD-MCNC: 7.7 G/DL (ref 11.7–15.7)
IMM GRANULOCYTES # BLD: 0.1 10E9/L (ref 0–0.4)
IMM GRANULOCYTES NFR BLD: 0.6 %
INTERPRETATION ECG - MUSE: NORMAL
LYMPHOCYTES # BLD AUTO: 1 10E9/L (ref 0.8–5.3)
LYMPHOCYTES NFR BLD AUTO: 9.6 %
MCH RBC QN AUTO: 32.6 PG (ref 26.5–33)
MCHC RBC AUTO-ENTMCNC: 35.3 G/DL (ref 31.5–36.5)
MCV RBC AUTO: 92 FL (ref 78–100)
MONOCYTES # BLD AUTO: 0.6 10E9/L (ref 0–1.3)
MONOCYTES NFR BLD AUTO: 5.7 %
NEUTROPHILS # BLD AUTO: 8.8 10E9/L (ref 1.6–8.3)
NEUTROPHILS NFR BLD AUTO: 83.8 %
NRBC # BLD AUTO: 0 10*3/UL
NRBC BLD AUTO-RTO: 0 /100
NUM BPU REQUESTED: 2
PLATELET # BLD AUTO: 210 10E9/L (ref 150–450)
RBC # BLD AUTO: 2.18 10E12/L (ref 3.8–5.2)
SPECIMEN EXP DATE BLD: NORMAL
TRANSFUSION STATUS PATIENT QL: NORMAL
TRANSFUSION STATUS PATIENT QL: NORMAL
TSH SERPL DL<=0.005 MIU/L-ACNC: 0.48 MU/L (ref 0.4–4)
WBC # BLD AUTO: 10.5 10E9/L (ref 4–11)

## 2021-04-02 PROCEDURE — 36415 COLL VENOUS BLD VENIPUNCTURE: CPT | Performed by: SURGERY

## 2021-04-02 PROCEDURE — 250N000011 HC RX IP 250 OP 636: Performed by: INTERNAL MEDICINE

## 2021-04-02 PROCEDURE — 258N000003 HC RX IP 258 OP 636: Performed by: NURSE ANESTHETIST, CERTIFIED REGISTERED

## 2021-04-02 PROCEDURE — 250N000009 HC RX 250: Performed by: ANESTHESIOLOGY

## 2021-04-02 PROCEDURE — 120N000001 HC R&B MED SURG/OB

## 2021-04-02 PROCEDURE — P9016 RBC LEUKOCYTES REDUCED: HCPCS | Performed by: PHYSICIAN ASSISTANT

## 2021-04-02 PROCEDURE — 85025 COMPLETE CBC W/AUTO DIFF WBC: CPT | Performed by: INTERNAL MEDICINE

## 2021-04-02 PROCEDURE — 250N000013 HC RX MED GY IP 250 OP 250 PS 637: Performed by: SURGERY

## 2021-04-02 PROCEDURE — 85018 HEMOGLOBIN: CPT | Performed by: SURGERY

## 2021-04-02 PROCEDURE — 99233 SBSQ HOSP IP/OBS HIGH 50: CPT | Performed by: INTERNAL MEDICINE

## 2021-04-02 PROCEDURE — 250N000011 HC RX IP 250 OP 636: Performed by: ANESTHESIOLOGY

## 2021-04-02 PROCEDURE — 82947 ASSAY GLUCOSE BLOOD QUANT: CPT | Performed by: INTERNAL MEDICINE

## 2021-04-02 PROCEDURE — 250N000011 HC RX IP 250 OP 636: Performed by: NURSE ANESTHETIST, CERTIFIED REGISTERED

## 2021-04-02 PROCEDURE — 250N000011 HC RX IP 250 OP 636: Performed by: SURGERY

## 2021-04-02 PROCEDURE — 272N000004 HC RX 272: Performed by: SURGERY

## 2021-04-02 PROCEDURE — 258N000003 HC RX IP 258 OP 636: Performed by: SURGERY

## 2021-04-02 PROCEDURE — 999N001017 HC STATISTIC GLUCOSE BY METER IP

## 2021-04-02 PROCEDURE — 36415 COLL VENOUS BLD VENIPUNCTURE: CPT | Performed by: INTERNAL MEDICINE

## 2021-04-02 PROCEDURE — 250N000009 HC RX 250: Performed by: NURSE ANESTHETIST, CERTIFIED REGISTERED

## 2021-04-02 RX ORDER — SODIUM CHLORIDE, SODIUM LACTATE, POTASSIUM CHLORIDE, CALCIUM CHLORIDE 600; 310; 30; 20 MG/100ML; MG/100ML; MG/100ML; MG/100ML
INJECTION, SOLUTION INTRAVENOUS CONTINUOUS PRN
Status: DISCONTINUED | OUTPATIENT
Start: 2021-04-01 | End: 2021-04-02

## 2021-04-02 RX ORDER — HYDROMORPHONE HCL IN WATER/PF 6 MG/30 ML
0.2 PATIENT CONTROLLED ANALGESIA SYRINGE INTRAVENOUS
Status: DISCONTINUED | OUTPATIENT
Start: 2021-04-02 | End: 2021-04-06 | Stop reason: HOSPADM

## 2021-04-02 RX ORDER — ONDANSETRON 2 MG/ML
INJECTION INTRAMUSCULAR; INTRAVENOUS PRN
Status: DISCONTINUED | OUTPATIENT
Start: 2021-04-02 | End: 2021-04-02

## 2021-04-02 RX ORDER — DEXTROAMPHETAMINE SACCHARATE, AMPHETAMINE ASPARTATE, DEXTROAMPHETAMINE SULFATE AND AMPHETAMINE SULFATE 5; 5; 5; 5 MG/1; MG/1; MG/1; MG/1
10 TABLET ORAL DAILY
COMMUNITY

## 2021-04-02 RX ORDER — POLYETHYLENE GLYCOL 3350 17 G/17G
17 POWDER, FOR SOLUTION ORAL DAILY
Status: DISCONTINUED | OUTPATIENT
Start: 2021-04-03 | End: 2021-04-02

## 2021-04-02 RX ORDER — NEOSTIGMINE METHYLSULFATE 1 MG/ML
VIAL (ML) INJECTION PRN
Status: DISCONTINUED | OUTPATIENT
Start: 2021-04-02 | End: 2021-04-02

## 2021-04-02 RX ORDER — AMOXICILLIN 250 MG
1 CAPSULE ORAL 2 TIMES DAILY
Status: DISCONTINUED | OUTPATIENT
Start: 2021-04-02 | End: 2021-04-02

## 2021-04-02 RX ORDER — SODIUM CHLORIDE, SODIUM LACTATE, POTASSIUM CHLORIDE, CALCIUM CHLORIDE 600; 310; 30; 20 MG/100ML; MG/100ML; MG/100ML; MG/100ML
INJECTION, SOLUTION INTRAVENOUS CONTINUOUS
Status: DISCONTINUED | OUTPATIENT
Start: 2021-04-02 | End: 2021-04-02 | Stop reason: HOSPADM

## 2021-04-02 RX ORDER — HYDROMORPHONE HYDROCHLORIDE 1 MG/ML
.3-.5 INJECTION, SOLUTION INTRAMUSCULAR; INTRAVENOUS; SUBCUTANEOUS EVERY 5 MIN PRN
Status: DISCONTINUED | OUTPATIENT
Start: 2021-04-02 | End: 2021-04-02 | Stop reason: HOSPADM

## 2021-04-02 RX ORDER — ACETAMINOPHEN 325 MG/1
650 TABLET ORAL EVERY 4 HOURS PRN
Status: DISCONTINUED | OUTPATIENT
Start: 2021-04-05 | End: 2021-04-06 | Stop reason: HOSPADM

## 2021-04-02 RX ORDER — POLYETHYLENE GLYCOL 3350 17 G/17G
17 POWDER, FOR SOLUTION ORAL DAILY PRN
Status: DISCONTINUED | OUTPATIENT
Start: 2021-04-02 | End: 2021-04-06 | Stop reason: HOSPADM

## 2021-04-02 RX ORDER — PROPOFOL 10 MG/ML
INJECTION, EMULSION INTRAVENOUS CONTINUOUS PRN
Status: DISCONTINUED | OUTPATIENT
Start: 2021-04-02 | End: 2021-04-02

## 2021-04-02 RX ORDER — ONDANSETRON 4 MG/1
4 TABLET, ORALLY DISINTEGRATING ORAL EVERY 30 MIN PRN
Status: DISCONTINUED | OUTPATIENT
Start: 2021-04-02 | End: 2021-04-02 | Stop reason: HOSPADM

## 2021-04-02 RX ORDER — ACETAMINOPHEN 325 MG/1
975 TABLET ORAL EVERY 8 HOURS
Status: COMPLETED | OUTPATIENT
Start: 2021-04-02 | End: 2021-04-04

## 2021-04-02 RX ORDER — NICOTINE POLACRILEX 4 MG
15-30 LOZENGE BUCCAL
Status: DISCONTINUED | OUTPATIENT
Start: 2021-04-02 | End: 2021-04-06 | Stop reason: HOSPADM

## 2021-04-02 RX ORDER — DOCUSATE SODIUM 100 MG/1
100 CAPSULE, LIQUID FILLED ORAL 2 TIMES DAILY
Status: DISCONTINUED | OUTPATIENT
Start: 2021-04-02 | End: 2021-04-02

## 2021-04-02 RX ORDER — KETAMINE HYDROCHLORIDE 10 MG/ML
INJECTION INTRAMUSCULAR; INTRAVENOUS PRN
Status: DISCONTINUED | OUTPATIENT
Start: 2021-04-02 | End: 2021-04-02

## 2021-04-02 RX ORDER — ONDANSETRON 2 MG/ML
4 INJECTION INTRAMUSCULAR; INTRAVENOUS EVERY 6 HOURS PRN
Status: DISCONTINUED | OUTPATIENT
Start: 2021-04-02 | End: 2021-04-06 | Stop reason: HOSPADM

## 2021-04-02 RX ORDER — BISACODYL 10 MG
10 SUPPOSITORY, RECTAL RECTAL DAILY PRN
Status: DISCONTINUED | OUTPATIENT
Start: 2021-04-02 | End: 2021-04-06 | Stop reason: HOSPADM

## 2021-04-02 RX ORDER — OXYCODONE HYDROCHLORIDE 5 MG/1
5 TABLET ORAL EVERY 4 HOURS PRN
Status: DISCONTINUED | OUTPATIENT
Start: 2021-04-02 | End: 2021-04-06 | Stop reason: HOSPADM

## 2021-04-02 RX ORDER — SODIUM CHLORIDE AND POTASSIUM CHLORIDE 150; 450 MG/100ML; MG/100ML
INJECTION, SOLUTION INTRAVENOUS CONTINUOUS
Status: DISCONTINUED | OUTPATIENT
Start: 2021-04-02 | End: 2021-04-04

## 2021-04-02 RX ORDER — DEXTROSE MONOHYDRATE, SODIUM CHLORIDE, AND POTASSIUM CHLORIDE 50; 1.49; 4.5 G/1000ML; G/1000ML; G/1000ML
INJECTION, SOLUTION INTRAVENOUS CONTINUOUS
Status: DISCONTINUED | OUTPATIENT
Start: 2021-04-02 | End: 2021-04-02

## 2021-04-02 RX ORDER — LABETALOL HYDROCHLORIDE 5 MG/ML
10 INJECTION, SOLUTION INTRAVENOUS EVERY 5 MIN PRN
Status: DISCONTINUED | OUTPATIENT
Start: 2021-04-02 | End: 2021-04-02 | Stop reason: HOSPADM

## 2021-04-02 RX ORDER — OXYCODONE HYDROCHLORIDE 5 MG/1
10 TABLET ORAL EVERY 4 HOURS PRN
Status: DISCONTINUED | OUTPATIENT
Start: 2021-04-02 | End: 2021-04-06 | Stop reason: HOSPADM

## 2021-04-02 RX ORDER — LIDOCAINE 40 MG/G
CREAM TOPICAL
Status: DISCONTINUED | OUTPATIENT
Start: 2021-04-02 | End: 2021-04-06 | Stop reason: HOSPADM

## 2021-04-02 RX ORDER — DEXTROSE MONOHYDRATE 25 G/50ML
25-50 INJECTION, SOLUTION INTRAVENOUS
Status: DISCONTINUED | OUTPATIENT
Start: 2021-04-02 | End: 2021-04-06 | Stop reason: HOSPADM

## 2021-04-02 RX ORDER — ONDANSETRON 2 MG/ML
4 INJECTION INTRAMUSCULAR; INTRAVENOUS EVERY 30 MIN PRN
Status: DISCONTINUED | OUTPATIENT
Start: 2021-04-02 | End: 2021-04-02 | Stop reason: HOSPADM

## 2021-04-02 RX ORDER — DEXAMETHASONE SODIUM PHOSPHATE 4 MG/ML
INJECTION, SOLUTION INTRA-ARTICULAR; INTRALESIONAL; INTRAMUSCULAR; INTRAVENOUS; SOFT TISSUE PRN
Status: DISCONTINUED | OUTPATIENT
Start: 2021-04-02 | End: 2021-04-02

## 2021-04-02 RX ORDER — FENTANYL CITRATE 50 UG/ML
INJECTION, SOLUTION INTRAMUSCULAR; INTRAVENOUS PRN
Status: DISCONTINUED | OUTPATIENT
Start: 2021-04-02 | End: 2021-04-02

## 2021-04-02 RX ORDER — AMOXICILLIN 250 MG
1 CAPSULE ORAL
Status: DISCONTINUED | OUTPATIENT
Start: 2021-04-02 | End: 2021-04-06 | Stop reason: HOSPADM

## 2021-04-02 RX ORDER — HYDROMORPHONE HYDROCHLORIDE 1 MG/ML
0.4 INJECTION, SOLUTION INTRAMUSCULAR; INTRAVENOUS; SUBCUTANEOUS
Status: DISCONTINUED | OUTPATIENT
Start: 2021-04-02 | End: 2021-04-06 | Stop reason: HOSPADM

## 2021-04-02 RX ORDER — ONDANSETRON 4 MG/1
4 TABLET, ORALLY DISINTEGRATING ORAL EVERY 6 HOURS PRN
Status: DISCONTINUED | OUTPATIENT
Start: 2021-04-02 | End: 2021-04-06 | Stop reason: HOSPADM

## 2021-04-02 RX ORDER — FENTANYL CITRATE 50 UG/ML
25-50 INJECTION, SOLUTION INTRAMUSCULAR; INTRAVENOUS
Status: DISCONTINUED | OUTPATIENT
Start: 2021-04-02 | End: 2021-04-02 | Stop reason: HOSPADM

## 2021-04-02 RX ORDER — DOCUSATE SODIUM 100 MG/1
100 CAPSULE, LIQUID FILLED ORAL 2 TIMES DAILY PRN
Status: DISCONTINUED | OUTPATIENT
Start: 2021-04-02 | End: 2021-04-06 | Stop reason: HOSPADM

## 2021-04-02 RX ORDER — PROCHLORPERAZINE MALEATE 5 MG
10 TABLET ORAL EVERY 6 HOURS PRN
Status: DISCONTINUED | OUTPATIENT
Start: 2021-04-02 | End: 2021-04-06 | Stop reason: HOSPADM

## 2021-04-02 RX ADMIN — PROPOFOL 100 MCG/KG/MIN: 10 INJECTION, EMULSION INTRAVENOUS at 00:18

## 2021-04-02 RX ADMIN — HYDROMORPHONE HYDROCHLORIDE 0.2 MG: 0.2 INJECTION, SOLUTION INTRAMUSCULAR; INTRAVENOUS; SUBCUTANEOUS at 10:08

## 2021-04-02 RX ADMIN — ONDANSETRON 4 MG: 4 TABLET, ORALLY DISINTEGRATING ORAL at 17:39

## 2021-04-02 RX ADMIN — ACETAMINOPHEN 975 MG: 325 TABLET, FILM COATED ORAL at 20:31

## 2021-04-02 RX ADMIN — ONDANSETRON HYDROCHLORIDE 4 MG: 2 INJECTION, SOLUTION INTRAVENOUS at 00:10

## 2021-04-02 RX ADMIN — ACETAMINOPHEN 975 MG: 325 TABLET, FILM COATED ORAL at 12:19

## 2021-04-02 RX ADMIN — HYDROMORPHONE HYDROCHLORIDE 0.4 MG: 1 INJECTION, SOLUTION INTRAMUSCULAR; INTRAVENOUS; SUBCUTANEOUS at 21:31

## 2021-04-02 RX ADMIN — POTASSIUM CHLORIDE AND SODIUM CHLORIDE: 450; 150 INJECTION, SOLUTION INTRAVENOUS at 20:01

## 2021-04-02 RX ADMIN — POTASSIUM CHLORIDE, DEXTROSE MONOHYDRATE AND SODIUM CHLORIDE: 150; 5; 450 INJECTION, SOLUTION INTRAVENOUS at 04:22

## 2021-04-02 RX ADMIN — FENTANYL CITRATE 50 MCG: 50 INJECTION, SOLUTION INTRAMUSCULAR; INTRAVENOUS at 00:12

## 2021-04-02 RX ADMIN — ONDANSETRON 4 MG: 4 TABLET, ORALLY DISINTEGRATING ORAL at 07:54

## 2021-04-02 RX ADMIN — Medication 50 MG: at 00:18

## 2021-04-02 RX ADMIN — HYDROMORPHONE HYDROCHLORIDE 0.4 MG: 1 INJECTION, SOLUTION INTRAMUSCULAR; INTRAVENOUS; SUBCUTANEOUS at 12:20

## 2021-04-02 RX ADMIN — PHENYLEPHRINE HYDROCHLORIDE 100 MCG: 10 INJECTION INTRAVENOUS at 00:00

## 2021-04-02 RX ADMIN — POTASSIUM CHLORIDE AND SODIUM CHLORIDE: 450; 150 INJECTION, SOLUTION INTRAVENOUS at 09:51

## 2021-04-02 RX ADMIN — IBUPROFEN 600 MG: 600 TABLET ORAL at 17:39

## 2021-04-02 RX ADMIN — NEOSTIGMINE METHYLSULFATE 3 MG: 1 INJECTION, SOLUTION INTRAVENOUS at 00:46

## 2021-04-02 RX ADMIN — HYDROMORPHONE HYDROCHLORIDE 0.5 MG: 1 INJECTION, SOLUTION INTRAMUSCULAR; INTRAVENOUS; SUBCUTANEOUS at 01:40

## 2021-04-02 RX ADMIN — FENTANYL CITRATE 50 MCG: 50 INJECTION, SOLUTION INTRAMUSCULAR; INTRAVENOUS at 00:00

## 2021-04-02 RX ADMIN — DEXAMETHASONE SODIUM PHOSPHATE 4 MG: 4 INJECTION, SOLUTION INTRA-ARTICULAR; INTRALESIONAL; INTRAMUSCULAR; INTRAVENOUS; SOFT TISSUE at 00:10

## 2021-04-02 RX ADMIN — SODIUM CHLORIDE, POTASSIUM CHLORIDE, SODIUM LACTATE AND CALCIUM CHLORIDE: 600; 310; 30; 20 INJECTION, SOLUTION INTRAVENOUS at 00:21

## 2021-04-02 RX ADMIN — MIDAZOLAM 1 MG: 1 INJECTION INTRAMUSCULAR; INTRAVENOUS at 00:10

## 2021-04-02 RX ADMIN — HYDROMORPHONE HYDROCHLORIDE 0.2 MG: 0.2 INJECTION, SOLUTION INTRAMUSCULAR; INTRAVENOUS; SUBCUTANEOUS at 17:39

## 2021-04-02 RX ADMIN — ACETAMINOPHEN 975 MG: 325 TABLET, FILM COATED ORAL at 05:20

## 2021-04-02 RX ADMIN — GLYCOPYRROLATE 0.4 MG: 0.2 INJECTION, SOLUTION INTRAMUSCULAR; INTRAVENOUS at 00:46

## 2021-04-02 RX ADMIN — HYDROMORPHONE HYDROCHLORIDE 0.4 MG: 1 INJECTION, SOLUTION INTRAMUSCULAR; INTRAVENOUS; SUBCUTANEOUS at 07:40

## 2021-04-02 ASSESSMENT — ACTIVITIES OF DAILY LIVING (ADL)
ADLS_ACUITY_SCORE: 18
ADLS_ACUITY_SCORE: 16
ADLS_ACUITY_SCORE: 18
ADLS_ACUITY_SCORE: 16
ADLS_ACUITY_SCORE: 16

## 2021-04-02 NOTE — ANESTHESIA PROCEDURE NOTES
Airway       Patient location during procedure: OR       Procedure Start/Stop Times: 4/2/2021 12:00 AM  Staff -        CRNA: Kolton Chen APRN CRNA       Performed By: CRNAIndications and Patient Condition       Indications for airway management: jennifer-procedural       Induction type:intravenous       Mask difficulty assessment: 0 - not attempted    Final Airway Details       Final airway type: endotracheal airway       Successful airway: ETT - single  Endotracheal Airway Details        ETT size (mm): 7.0       Successful intubation technique: direct laryngoscopy       DL Blade Type: Moser 2       Grade View of Cords: 1       Adjucts: stylet       Position: Right       Measured from: gums/teeth       Secured at (cm): 21       Bite block used: None    Post intubation assessment        Placement verified by: capnometry, equal breath sounds and chest rise        Number of attempts at approach: 1       Number of other approaches attempted: 0       Secured with: plastic tape       Ease of procedure: easy       Dentition: Intact and Unchanged    Medication(s) Administered   Medication Administration Time: 4/1/2021 11:57 PM

## 2021-04-02 NOTE — PROGRESS NOTES
I was called this evening about increasing abdominal pain and repeated syncopal episodes in this patient who underwent laparoscopic cholecystectomy earlier today.  Her hemoglobin had dropped to 8.5.  After discussion with the hospitalist team, we ordered a stat CT scan to look for active bleeding.  This showed a large amount of hemoperitoneum as well as a blush of contrast from the gallbladder fossa suggesting active bleeding.  I recommended that we take the patient urgently to the operating room for evacuation of hematoma and control of bleeding.  We discussed the procedure, along with its risks and complications, in detail.  The patient agreed to proceed.  We will go immediately to the operating room.  2 units of blood are being prepared for presumed perioperative transfusion.    Caleb Valle MD  [facility]

## 2021-04-02 NOTE — PHARMACY-ADMISSION MEDICATION HISTORY
Admission medication history interview status for this patient is complete. See [facility] admission navigator for allergy information, prior to admission medications and immunization status.     Medication history interview done, indicate source(s): Patient  Medication history resources (including written lists, pill bottles, clinic record): Nik fill history  Pharmacy: [facility]    Changes made to PTA medication list:  Added: none  Deleted: none  Changed: clarified instructions/directions on ketone strips, Novolog vial, glucagon, prenatal vitamin, changed pump settings (last updated 2014 with different insulin pump), changed tablet strength of noon Adderall dose to reflect fill history     Actions taken by pharmacist (provider contacted, etc):None     Additional medication history information: Patient changed Omnipod insulin pump in AM of 4/2/21; changes every 80 hours and confirmed she has additional pump supplies available if additional changes were needed. Patient verified home supply of glucagon if needed for hypoglycemia.     Medication reconciliation/reorder completed by provider prior to medication history?  Y     For patients on insulin therapy:   How many times do you check your blood glucose per day? Uses Dexcom CGM  How many episodes of hypoglycemia do you typically have in last 2 weeks? Reported 3-4, notices at BG 55-65, references relatively new pump in Omnipod and getting used to settings.    Do you have a Continuous Glucose Monitor (CGM)? Yes    Prior to Admission medications    Medication Sig Last Dose Taking? Auth Provider   amphetamine-dextroamphetamine (ADDERALL) 20 MG tablet Take 10 mg by mouth daily Take at noon 3/31/2021 at Unknown time Yes Unknown, Entered By History   amphetamine-dextroamphetamine (ADDERALL) 20 MG tablet Take 20 mg by mouth every morning 3/31/2021 at Unknown time Yes Reported, Patient   insulin aspart (NOVOLOG VIAL) 100 UNITS/ML vial Use with insulin pump  Yes  Unknown, Entered By History   INSULIN PUMP - OUTPATIENT Date Last Updated: 2021  Omnipod  BASAL RATES and times:  12   AM (midnight): 0.8 units/hour    7     AM: 0.7 units/hour   CARB RATIO and times:  12   AM (midnight): 10  Corection Factor (Sensitivity) and times:  12   AM (midnight): 95 mg/dL  BLOOD GLUCOSE TARGET and times:  12   AM (midnight): 130  6     AM:  100  9    PM:  130  Active Insulin Time: 4 hours  Yes Unknown, Entered By History   ondansetron (ZOFRAN) 4 MG tablet Take 1 tablet (4 mg) by mouth every 8 hours as needed for nausea  Yes Heath Cutler MD   oxyCODONE (ROXICODONE) 5 MG tablet Take 1-2 tablets (5-10 mg) by mouth every 4 hours as needed for moderate to severe pain  Yes Heath Cutler MD   Prenatal Multivit-Min-Fe-FA (PRE-MIRANDA FORMULA PO) Take 1 chew tab by mouth daily  3/31/2021 at Unknown time Yes Reported, Patient   acetone, Urine, test (KETOSTIX) STRP 1 strip by In Vitro route once as needed  Unknown at Unknown time  Reported, Patient   glucagon (GLUCAGON EMERGENCY) 1 MG injection 1 mg once as needed for low blood sugar  Unknown at Unknown time  Reported, Patient       Caroline Rasheed  PGY-1 Pharmacy Practice Resident

## 2021-04-02 NOTE — PLAN OF CARE
"PRIMARY DIAGNOSIS: Lap Julia, Syncope   OUTPATIENT/OBSERVATION GOALS TO BE MET BEFORE DISCHARGE:  1. Stable vital signs Yes, when pt not having syncopal episode  2. Tolerating diet: Pt had a couple bites of pudding and pain increased. Pt denies nausea  3. Pain controlled with oral pain medications:  No, pt refusing to try narcotics as they make her sick/nausea.   4. Positive bowel sounds:  Yes  5. Voiding without difficulty:  Voided in PACU  6. Able to ambulate:  Yes  7. Provider specific discharge goals met:  No    Discharge Planner Nurse   Safe discharge environment identified: Yes  Barriers to discharge: Yes       Entered by: Maxine Gayle 04/01/2021 9:54 PM     Vitals are Temp: 98.7  F (37.1  C) Temp src: Oral BP: 97/55 Pulse: 63   Resp: 18 SpO2: 99 %.  Patient is Alert and Oriented x4. They are 2 assist with Gait Belt.  Pt is a Full liquid diet. Lap sites WNL. Abdomen distended and pt reporting 7-9/10 gas pain. Ibuprofen and simethicone given with no relief. Pt trying to stay away from narcotics as they make her sick. Writer discussed with pt to premedicate with zofran prior to narcotics. Pt declined and opted for tylenol. Writer left pt's room to get Tylenol and pt's  called stating that pt is \"about to pass out\". Rapid response called. Pt was semi-awake when writer entered room and able to follow commands such as opening eyes and squeezing writer's hand and respond to questions. Pt pale, BP 77/40 and HR 48. Bolus given, labs drawn, and Abd CT ordered. Will continue to monitor and provide cares.     Please review provider order for any additional goals.   Nurse to notify provider when observation goals have been met and patient is ready for discharge.  "

## 2021-04-02 NOTE — PROVIDER NOTIFICATION
11:41 PM  Provider notified: ZEINAB Singh via pager  Reason: Notified by Dr. Arcenio Castillo from [facility] that CT shows large amount of active bleeding in abdomen.

## 2021-04-02 NOTE — PROGRESS NOTES
2225: Writer and support staff assisted pt from bed to cart via slide sheet for Abd CT. Pt did not tolerate and had a syncopal episode. Pt turned pale and became intermittently unresponsive. ZEINAB Singh notified. BP: 69/32, HR: 49. Bolus administered per verbal orders from Samantha. Dr. Ramsey and Samantha assessed pt. Pt became more responsive and transferred to CT.

## 2021-04-02 NOTE — PROGRESS NOTES
[facility]    General Surgery Progress Note          Assessment and Plan:   Assessment:    Jaymie Morales is a 40 year old female s/p Procedure(s):  1 Day Post-Op Laparoscopic cholecystectomy with choleangiograms.  Had syncopal episodes in recovery and admitted overnight.  She later developed increasing abdominal pain, distention and syncopal episodes. Her Hgb had dropped to 8.5 and a stat CT scan showed a large amount of hemoperitoneum as well as a blush of contrast from the gallbladder fossa suggesting active bleeding.  She was urgently taken to the OR where she underwent Diagnostic laparoscopi, evacuation of hemoperitoneum, and ligation of hepatic vessel   - Hgb 7.0, still with dizziness when sitting up - Hospitalist ordered transfusion of 1u PRBC   - continue close monitoring      Plan:   Pain mgmt: IV dilaudid  Diet: clear liquid diet  IVFs: 1/2 NS w/ K @ 100 mL/hr  Read: continue  Activity: up with assist when dizziness resolves  DVT prophylaxis: ambulation, PCD, no chemoprophylaxis due to bleeding             Interval History:   Pt resting in bed,  at bedside.  She rough morning with excruciating bladder pain and upper chest/left shoulder pain.  Pain improved with Dilaudid and adjustment of read cath balloon. Still dizzy when she tried to sit at bedside.  Feels very tired.         Physical Exam:   Temp: 98  F (36.7  C) Temp src: Oral BP: 114/62 Pulse: 109   Resp: 20   SpO2: 96 % O2 Device: None (Room air) Oxygen Delivery: 2 LPM      I/O last 3 completed shifts:  In: 2700 [P.O.:100; I.V.:2600]  Out: 685 [Urine:650; Drains:10; Blood:25]    Constitutional: alert, no distress and pale   Respiratory: non labored  Abdomen: not examined, pt just got comfortable and resting  BOBBY with thin bloody output             Data:   Data   Recent Labs   Lab 04/02/21  0731 04/02/21  0115 04/01/21  2120 04/01/21  1534 04/01/21  1534   WBC 10.5  --  10.1  --  9.3   HGB 7.1* 7.0* 8.5*   < > 11.5*    MCV 92  --  93  --  96     --  228  --  235   NA  --   --   --   --  138   POTASSIUM  --   --   --   --  4.1   CHLORIDE  --   --   --   --  107   CO2  --   --   --   --  28   BUN  --   --   --   --  17   CR  --   --   --   --  0.83   ANIONGAP  --   --   --   --  3   JOSE A  --   --   --   --  7.8*   *  --   --   --  157*    < > = values in this interval not displayed.        Marilu Hansen PA-C     As above, she now reports that her abdomen feels okay.  She appears sleepy.  Discussed findings last night with her and her .  Atypical presentation with lack of tachycardia.  Discussed this informally with cardiology, they feel she may have had a hyperactive vagal response.  They recommended fluid as needed and allowing her to recover from surgery and potentially seeing them in follow-up in the office as an outpatient.  If she fails to improve and continues to have problems with dizziness/fainting, they would be happy to see her in the hospital as well.    Heath Cutler MD  4/2/2021 1:15 PM

## 2021-04-02 NOTE — PROGRESS NOTES
[facility]  Hospitalist Progress Note  Corry Fernandez MD 04/02/21  Text Page  Pager: [phone] (7am-6pm)    Reason for Stay (Diagnosis): cholecystectomy with resultant hemoperitoneum         Assessment and Plan:      Summary of Stay: Jaymie Morales is a 40 year old female with past medical history of DM1 with insulin pump, ADD and history of recurrent syncope who was admitted on 4/1/2021 for elective cholecystectomy for chronic cholecystitis.  Hospitalist team consulted STAT for recurrent bradycardia, hypotension and syncope on the evening of 4/1.    On the evening of 4/1 patient had recurrent syncope, bradycardia and hypotension along with ABLA.  STAT CT obtained which showed hemoperitoneum.  Patient underwent urgent diagnostic laparoscopy and is s/p evacuation of hemoperitoneum and ligation of hepatic vessel.  Today patient will receive PRBC transfusion.    Problem List/Assessment and Plan:     1. Cholecystectomy with Resultant Hemoperitoneum and Urgent Laparoscopy with evacuation of hematoma: As above underwent cholecystectomy for chronic cholecystitis on 4/1.  Patient subsequently had bradycardia, hypotension and syncope and was found to have acute anemia.  Stat CT obtained which showed large amount of hemoperitoneum as well as a blush of contrast from the gallbladder fossa suggesting active bleeding.  She was taken urgently to the OR on the evening of 4/1 for diagnostic laparoscopy with evacuation of hemoperitoneum and ligation of bleeding hepatic vessel.  -General surgery following, defer diet to surgery  -Pain control with Tylenol, Ibuprofen, Oxycodone or IV Dilaudid for severe pain  -Continue IV fluids    2.  Hemorrhagic shock - Resolved: Patient had hypotension, bradycardia and numerous episodes of syncope on the evening of 4/1.  Multiple RRT's were called during this time.  She received multiple boluses of fluid.  This did somewhat help her blood pressure but only for short period  of time.  Her hemoglobin in 10/2020 was 13.6.  There is a hemoglobin from 4 1 which is reported as 7.9 but I believe this is an inaccurate result as later in the day it was 11.5 and she did not receive a transfusion.  After her surgery her hemoglobin decreased from 11.5-8.5.  She underwent a stat CT which showed hemoperitoneum and bleeding which appeared to be coming from the gallbladder fossa.  As above she was urgently taken to the OR where she underwent evacuation of hemoperitoneum and ligation of a bleeding hepatic vessel.  Her hemoglobin has now stabilized at 7.1 this morning.  She feels very dizzy if she even tried to sit up at the side of bed.  We will transfuse 1 unit PRBCs today.  -1 unit PRBCs  -Repeat hemoglobin this afternoon and again tomorrow morning    3.  Type 1 diabetes with insulin pump: I do not have recent hemoglobin A1c, I will add this on.  She can continue to use her insulin pump.  Pharmacy consulted to assist with entering pump settings within epic.  She was quite hyperglycemic this morning but she was receiving dextrose in her IV fluids.  Have changed her IV fluids to nondextrose containing fluids.  -Continue insulin pump    4.  ADD: Hold Adderall at this time.    5.  Syncope: Initially unclear what was causing her syncope.  As above she had hemorrhagic shock which was the likely source of her syncope.  We will continue to monitor on telemetry.  I have asked her to wait to try to ambulate till after her blood transfusion today.  At this time I do not see indication for an echo or EEG as I have another reason for her syncope.    Diet: Clear Liquid Diet    DVT Prophylaxis: Pneumatic Compression Devices - no chemical prophylaxis due to bleeding as above  Suarez Catheter: in place, indication: Anesthesia  Code Status: Full Code      Disposition Plan   Expected discharge: 2 - 3 days, recommended to prior living arrangement once adequate pain management/ tolerating PO medications, hemoglobin stable  "and no further syncope.  Entered: Corry Fernandez MD 04/02/2021, 9:34 AM       The patient's care was discussed with the Bedside Nurse, Patient and Patient's Family.    Hospitalist Service  [facility]          Interval History (Subjective):      The patient was seen rather urgently this morning and discussed with her bedside nurse.  Her  was also present.  She had severe pain in her abdomen, shoulders, chest and surrounding her Suarez catheter this morning.  Her nurse did decrease the bubble by 3 mL and adjust the Suarez catheter and this helped the catheter discomfort.  She was a so given IV Dilaudid and Zofran which has helped significantly.  I reviewed the events from last night including the multiple RRT's as well as urgent surgery.  She did try to sit on the edge of the bed this morning but felt very dizzy and lightheaded and had to lie back down.  She denies shortness of breath at this time.  Overall her pain is better after receiving Dilaudid.  I discussed the care plan with the patient as well as her  in detail.  Her hemoglobin is 7.1 this morning but she had a significant amount of blood loss and felt dizzy and lightheaded with even sitting on the edge of the bed this morning some point to transfuse 1 unit PRBCs.                  Physical Exam:      Last Vital Signs:  /62 (BP Location: Left arm)   Pulse 109   Temp 98  F (36.7  C) (Oral)   Resp 20   Ht 1.676 m (5' 6\")   Wt 58.5 kg (129 lb)   LMP 03/25/2021   SpO2 96%   BMI 20.82 kg/m      General: Lying in bed, slightly sedated but able to answer questions (she did just receive IV Dilaudid and Zofran), pale, no acute distress  HEENT: Normocephalic and atraumatic, eyes anicteric and without scleral injection, EOMI, face symmetric, MMM.  Cardiac: Tachycardic with regular rhythm, normal S1, S2. No m/g/r, no LE edema.  Pulmonary: Normal chest rise, normal work of breathing.  Lungs CTAB without crackles or " wheezing.  Abdomen: soft, diffuse mild tenderness, non-distended.  Normoactive bowel sounds, no guarding or rebound tenderness.  Extremities: no deformities.  Warm, well perfused.  Skin: no rashes or lesions.  Warm and Dry.  Neuro: No focal deficits.  Speech clear.  Moving all extremities in bed  Psych: Alert and oriented x3. Appropriate affect.         Medications:      All current medications were reviewed with changes reflected in problem list.         Data:      All new lab and imaging data was reviewed.   Labs:  Recent Labs   Lab 04/02/21  0731 04/01/21  1534   NA  --  138   POTASSIUM  --  4.1   CHLORIDE  --  107   CO2  --  28   ANIONGAP  --  3   * 157*   BUN  --  17   CR  --  0.83   GFRESTIMATED  --  88   GFRESTBLACK  --  >90   JOSE A  --  7.8*     Recent Labs   Lab 04/02/21  0731 04/02/21  0115 04/01/21  2120 04/01/21  1534 04/01/21  1534   WBC 10.5  --  10.1  --  9.3   HGB 7.1* 7.0* 8.5*   < > 11.5*   HCT 20.1*  --  24.6*  --  34.1*   MCV 92  --  93  --  96     --  228  --  235    < > = values in this interval not displayed.      Imaging:   Recent Results (from the past 24 hour(s))   XR Surgery CHEO L/T 5 Min Fluoro w Stills    Narrative    XR SURGERY CHEO FLUORO LESS THAN 5 MIN W STILLS 4/1/2021 11:09 AM     COMPARISON: None    HISTORY: Cholecystitis    NUMBER OF IMAGES ACQUIRED: 7    VIEWS: T-tube cholangiogram    FLUOROSCOPY TIME: .3 minute(s)      Impression    IMPRESSION: Normal bile duct anatomy. No bile duct dilation. No  evidence of choledocholithiasis.    NIKOS CASEY MD   CT Head w/o Contrast    Narrative    EXAM: CT HEAD W/O CONTRAST  LOCATION: [facility]  DATE/TIME: 4/1/2021 10:50 PM    INDICATION: Seizure, nontraumatic (Age 18-40y)  COMPARISON: 01/17/2012 brain MRI  TECHNIQUE: Routine CT Head without IV contrast. Multiplanar reformats. Dose reduction techniques were used.    FINDINGS:  INTRACRANIAL CONTENTS: No intracranial hemorrhage, extraaxial collection, or mass  effect.  No CT evidence of acute infarct. Normal parenchymal attenuation. Normal ventricles and sulci.     VISUALIZED ORBITS/SINUSES/MASTOIDS: No intraorbital abnormality. No paranasal sinus mucosal disease. No middle ear or mastoid effusion.    BONES/SOFT TISSUES: No acute abnormality.      Impression    IMPRESSION:  1.  No acute intracranial process.   CT Abdomen Pelvis w Contrast    Narrative    EXAM: CT ABDOMEN PELVIS W CONTRAST  LOCATION: [facility]  DATE/TIME: 4/1/2021 10:53 PM    INDICATION: Abdominal pain after cholecystectomy.  COMPARISON: Right upper quadrant ultrasound 12/25/2013.  TECHNIQUE: CT scan of the abdomen and pelvis was performed following injection of IV contrast. Multiplanar reformats were obtained. Dose reduction techniques were used.  CONTRAST: 65 mL Isovue-370    FINDINGS:   LOWER CHEST: Mild bibasilar atelectasis.    HEPATOBILIARY: Postcholecystectomy. Approximately 2.7 x 2.0 cm ill-defined hyperdense focus in the right upper abdomen near the cholecystectomy clips consistent with a focus of active hemorrhage (images 61-78 of axial series 3). Lobulated hyperdense   material along the inferior margin of the liver consistent with acute hematoma measures up to about 14 x 8 x 11 cm at this site. The hematoma results in mass effect upon the descending duodenum which is medially displaced. Large volume of hyperdense   fluid elsewhere throughout the abdomen and pelvis with a more layering configuration consistent with hemoperitoneum. Periportal edema. Otherwise normal liver. No significant biliary dilatation.    PANCREAS: Normal.    SPLEEN: Normal.    ADRENAL GLANDS: Normal.    KIDNEYS/BLADDER: Normal.    BOWEL: Normal.    LYMPH NODES: Normal.    VASCULATURE: Unremarkable.    PELVIC ORGANS: Normal.    MUSCULOSKELETAL: Normal.      Impression    IMPRESSION:   Active bleeding in the cholecystectomy bed and large volume of hemoperitoneum.    At 11:20 PM, attempts were made to contact  the referring physician who reportedly was in the process of taking the patient to the operating room. I discussed the findings with the patient's nurse, Maxine Gayle RN, at 11:25 PM.           Corry Fernandez MD

## 2021-04-02 NOTE — ANESTHESIA PREPROCEDURE EVALUATION
Anesthesia Pre-Procedure Evaluation    Patient: Jaymie Morales   MRN: [id number] : 1980        Preoperative Diagnosis: * No pre-op diagnosis entered *   Procedure : Procedure(s):  LAPAROSCOPY, DIAGNOSTIC     Past Medical History:   Diagnosis Date     Complication of anesthesia     Slow to wake after knee surgery     Diabetes (H)     Type 1      Past Surgical History:   Procedure Laterality Date     ABDOMEN SURGERY       ARTHROSCOPY KNEE Right       Allergies   Allergen Reactions     Sulfa Drugs Unknown and Hives     Sulfonamide Derivatives      Sulfa (Sulfonamide Antibiotics)      Social History     Tobacco Use     Smoking status: Never Smoker     Smokeless tobacco: Never Used   Substance Use Topics     Alcohol use: Yes     Alcohol/week: 0.0 standard drinks     Comment: Socially      Wt Readings from Last 1 Encounters:   21 58.5 kg (129 lb)        Anesthesia Evaluation   Pt has had prior anesthetic. Type: General.    No history of anesthetic complications       ROS/MED HX  ENT/Pulmonary:  - neg pulmonary ROS     Neurologic:  - neg neurologic ROS     Cardiovascular:  - neg cardiovascular ROS     METS/Exercise Tolerance:     Hematologic:  - neg hematologic  ROS     Musculoskeletal:  - neg musculoskeletal ROS     GI/Hepatic: Comment: Grace Dumont, earlier today.  Apparent bleeding with two syncopal episodes this evening.      Renal/Genitourinary:  - neg Renal ROS     Endo:     (+) type I DM, Using insulin,     Psychiatric/Substance Use:  - neg psychiatric ROS     Infectious Disease:  - neg infectious disease ROS     Malignancy:  - neg malignancy ROS     Other:  - neg other ROS          Physical Exam    Airway        Mallampati: II       Respiratory Devices and Support         Dental  no notable dental history         Cardiovascular   cardiovascular exam normal          Pulmonary   pulmonary exam normal                OUTSIDE LABS:  CBC:   Lab Results   Component Value Date    WBC 10.1 2021    WBC  9.3 04/01/2021    HGB 8.5 (L) 04/01/2021    HGB 9.8 (L) 04/01/2021    HCT 24.6 (L) 04/01/2021    HCT 34.1 (L) 04/01/2021     04/01/2021     04/01/2021     BMP:   Lab Results   Component Value Date     04/01/2021     10/02/2020    POTASSIUM 4.1 04/01/2021    POTASSIUM 3.7 10/02/2020    CHLORIDE 107 04/01/2021    CHLORIDE 103 10/02/2020    CO2 28 04/01/2021    CO2 27 10/02/2020    BUN 17 04/01/2021    BUN 15 10/02/2020    CR 0.83 04/01/2021    CR 0.80 10/02/2020     (H) 04/01/2021     (H) 10/02/2020     COAGS: No results found for: PTT, INR, FIBR  POC:   Lab Results   Component Value Date     (H) 04/01/2021    HCG Negative 04/01/2021    HCGS Negative 10/02/2020     HEPATIC:   Lab Results   Component Value Date    ALBUMIN 3.4 (L) 12/24/2013    PROTTOTAL 6.6 12/24/2013     OTHER:   Lab Results   Component Value Date    LACT 1.7 04/01/2021    A1C 6.9 (A) 09/19/2015    JOSE A 7.8 (L) 04/01/2021    MAG 2.1 10/02/2020    TSH 0.55 10/02/2020    CRP <2.9 10/02/2020    SED 6 10/02/2020       Anesthesia Plan    ASA Status:  2, emergent    NPO Status:  NPO Appropriate    Anesthesia Type: General.     - Airway: ETT   Induction: Intravenous, Propofol.   Maintenance: Balanced.        Consents    Anesthesia Plan(s) and associated risks, benefits, and realistic alternatives discussed. Questions answered and patient/representative(s) expressed understanding.     - Discussed with:  Patient         Postoperative Care    Pain management: IV analgesics, Oral pain medications, Multi-modal analgesia.   PONV prophylaxis: Ondansetron (or other 5HT-3), Dexamethasone or Solumedrol     Comments:                Doug Miranda MD

## 2021-04-02 NOTE — PROVIDER NOTIFICATION
Pt alert and  tolerating clear liquid diet. Has order for oral Tylenol capsule, should I administer? Please advise.

## 2021-04-02 NOTE — PLAN OF CARE
Patient arrived to unit at ~5:40 PM. Patient requested to try walking from the cart to bed. Patient was up w/ Ax2 and c/o abdominal pain with movement during ambulation. Patient assisted into bed and then became very pale and suddenly unresponsive. Patient was not responsive to touch, voice, or sternal rub. Patient placed in trendelenburg position. RRT called. Patient hypotensive (BP: 80/46) and bradycardic (HR: 46) at this time. Blood glucose 131. Patient has a insulin pump that she manages. Patient became responsive after ~4 minutes, but initially confused. Patient A&Ox4 after ~15 minutes. 1L NS bolus administered. EKG ordered. Remote tele placed. Lap sites WDL, ice applied for comfort. Abdomen appears distended. Given po Ibuprofen for surgical pain. Denies nausea at this time. Patient stated she would like to avoid narcotics if able. IVF infusing. Spouse remains at bedside. Neurology consulted. Plan for ECHO tomorrow AM.

## 2021-04-02 NOTE — OR NURSING
Spoke with DR Valle at this time and is aware of her hgb of 7.0,   and decided to hold blood at this time and will recheck hgb in am as ordered.  Order modified.

## 2021-04-02 NOTE — PLAN OF CARE
VSS. RA. Afebrile. A/O. Dilaudid given for pain, see MAR. IVF at 100ml\hr. 1 unit blood transfused. Hgb recheck at 1600. BOBBY and Erick in place. Pt has own insulin pump in place. Tolerating clear liquids. Discharge planned 2-3 days. Will continue POC.

## 2021-04-02 NOTE — PROVIDER NOTIFICATION
330 [address].- Requesting full liquid diet.  Per MD~ Full liquid diet if not nauseated and has good bowel sounds, passing gas.  + bowel sounds, not nauseated, has passed gas.

## 2021-04-02 NOTE — PROGRESS NOTES
Rapid Response Note    Rapid response called secondary to syncope/presyncope.  Pt seen and examined.  Pts  at bedside states that pt felt nauseous then suddenly became minimally responsive.  This episode lasted for a few minutes.  Pt was awake and alert but appeared somewhat confused on my arrival.  BS= 133.  Pt hypotensive with BP 80s systolic.  Gave 500 ml bolus.  EKG obtained shows sinus bradycardia with no acute ST changes.  Check LA and Hgb.  Pt with lap brunilda today. Pt on IVF.  Will start telemetry and order echocardiogram.  Per , the patient has been having episodes like this for quite sometime.  He reports that she was supposed to be worked up for seizures at one point.  Pt is on adderall.      Constitutional: Awake, confused, cooperative, no apparent distress.  Eyes: Conjunctiva and pupils examined and normal.  HEENT: Moist mucous membranes, normal dentition.  Respiratory: Clear to auscultation bilaterally, no crackles or wheezing.  Cardiovascular: Bradycardia, normal S1 and S2, and no murmur noted.  GI: Soft, non-distended, non-tender, normal bowel sounds.  Lymph/Hematologic: No anterior cervical or supraclavicular adenopathy.  Skin: No rashes, no cyanosis, no edema.  Musculoskeletal: No joint swelling, erythema or tenderness.  Neurologic: Cranial nerves 2-12 intact, normal strength and sensation.  Psychiatric: Alert, oriented to person, place and time, no obvious anxiety or depression.    Pt remembers the whole episode.  Afterwards she does report some abdominal pain, which has been ongoing since after surgery, headache.  Unclear etiology at this point.  Post surgical labs reviewed and unremarkable apart from a hemoglobin drop from 13.6 to 11.5.  This may be due to dilution from IVF.  Will recheck Hgb at 2300.  See work up above.  Discussed with  at bedside.  Will follow.

## 2021-04-02 NOTE — PROGRESS NOTES
0740 Pt in bed, writhing and c/o severe pain, shoulder, chest and abdominal pain. Pt c/o read causing excruiating pain, Dr. Fernandez and Dr Valle notified, pt hesitant to have read removed, writer decreased bubble by 3mL and adjusted read, gave 0.4mg Dilaudid IV and Zofran PO. Pt resting, calm and relaxed and states pain medication helped with relief. Will continue to monitor pain status.

## 2021-04-02 NOTE — PLAN OF CARE
Tele SR. VSS. Weaned off of oxygen, on room air. Arrived to unit around 0245. PIVx2, IV fluids infusing. Has read cather in place following laprascopic surgery, has 4 incisions on abdomen as a result and a BOBBY drain. Clear liquid diet. Sedated on arrival, more rousable towards the end of shift. A&Ox4 towards end of shift. Received scheduled tylenol for pain. hgb 7, awaiting new results.

## 2021-04-02 NOTE — PLAN OF CARE
ROOM # 231  Living Situation (if not independent, order SW consult): Independent at home.  Facility name:  : Raúl Morales (spouse): [phone]    Activity level at baseline: Independent  Activity level on admit: Ax2 + - Patient had an episode of unresponsiveness after trying to ambulate from the cart to bed when arriving to the unit. RRT called.     Patient registered to observation; given Patient Bill of Rights; given the opportunity to ask questions about observation status and their plan of care.  Patient has been oriented to the observation room, bathroom and call light is in place.    Discussed discharge goals and expectations with patient/family.

## 2021-04-02 NOTE — PROVIDER NOTIFICATION
Pt read catheter not draining well, bladder fullness felt upon palpitation. Pt wants read catheter removed for discomfort. Please advise.   Unable to assess due to medical condition

## 2021-04-02 NOTE — PROGRESS NOTES
Rapid Response    RRT called secondary to syncopal episode.  Patient was seen and examined.   at bedside reports that patient had another syncopal episode.  The patient had been reporting abdominal pain and feeling bloated.  Telemetry reveals normal sinus rhythm through the episode.  Patient awake and alert however does appear pale.  Blood pressure is 80s over 40s.  Patient given IV fluid bolus.  CBC obtained.  General surgery on-call will be notified.  Pending discussion with general surgery would consider CT abdomen and pelvis especially if hemoglobin drops further.  Hemoglobin has dropped from 11.5 to 9.8.  Patient with active bowel sounds, is tender to palpation diffusely in the abdomen.  Regular rate and rhythm.  Pale conjunctiva.  Echo pending.  Will follow.    ADDENDUM:  General Surgery will get CT abdomen and pelvis.  Will get CT brain as well.  EEG pending.  Echo pending.  IVF running.  Discussed with  at bedside.  Differential includes vasovagal, abdominal bleed, seizure.      ADDENDUM #2:  At 2230, pt had another episode of syncope while moving to stretch for CT.  Gave 1L bolus.  Will type and screen.  Check TSH.  Bradycardia with hypotension and associated symptoms consistent with vasovagal syncope.  Pt received precedex, ketamine, and propofol during procedure but no offending medications since.  Await CT abd/pelv and CT brain.  Will follow.    ADDENDUM #3:  General Surgery called, pt with active intraabdominal bleed.  Will take pt to OR now.   updated.

## 2021-04-02 NOTE — PROVIDER NOTIFICATION
7:59 PM  Provider notified: Samantha ARRIOLA via pager  Reason: pt having 9/10 gas pain and abdomen distended. Pt requesting order for simethicone or gas relieving agent.  Order: Simethicone ordered and given.

## 2021-04-02 NOTE — OP NOTE
General Surgery Operative Note    Pre-operative diagnosis: Post operative hemmorhage   Post-operative diagnosis: same   Procedure:  Laparoscopy with evacuation of hemoperitoneum and ligation of hepatic bleeding vessels   Surgeon: Caleb Valle MD   Assistant(s): Kacey Sherman PA-C - the physician assistant was medically necessary to assist in prepping, positioning, camera operation, retraction/exposure and closure of the port site.    Anesthesia: General    Estimated blood loss: 20 cc's   Drains placed: Americo   Complications:  None   Findings:   Approximately 2500 cc of clot were evacuated from the peritoneal cavity.  There was active bleeding from a tiny arterial vessel in the bed of the gallbladder.  This appeared to be coming from the side of the vessel hemostasis was achieved using LigaSure device.  After extensive irrigation of the abdomen and confirmation of good hemostasis, FloSeal and Surgicel Nu-Knit were placed in the gallbladder fossa.  A 15 [place] round Americo drain was placed across the gallbladder fossa as well.     Indication for operation: This is a 40-year-old woman who earlier in the day underwent a laparoscopic cholecystectomy which was uneventful.  Postoperatively, the patient had a syncopal episode.  She reported that she had had similar episodes in the past.  The patient was admitted for observation and had a number of further syncopal episodes.  She then began to develop abdominal distention and increasing abdominal pain.  Her hemoglobin also dropped to 8.5.  Urgent CT scan was obtained.  This revealed a large amount of pneumoperitoneum with a clear blush in the gallbladder fossa showing active bleeding.  The patient was therefore urgently brought to the preoperative holding area where urgent laparoscopy was recommended.  We discussed the procedure, along with its risks and complications, in detail.  The patient agreed to proceed.    Details of the operation: After informed consent, the  patient was taken to the operating room where she underwent satisfactory induction of general anesthesia.  The patient was sterilely prepped and draped and the whole supraumbilical incision was reopened.  The sutures were cut out and the Albright trocar was introduced and fixed in place using stay sutures.  Pneumoperitoneum was now achieved using CO2 insufflation.  The subxiphoid incision was extended somewhat and 11 mm trocar placed through this area we now began to evacuate a large amount of hematoma.  In the first minute or so the evacuation, we got out 900 cc of clot.  2 additional 5 mm ports were now placed on the right side.  1 of these was through one of the old incisions and the other one place more medially.  We now proceeded to evacuate out the gallbladder fossa.  There was an obvious area of arterial bleeding fairly high up in the fossa coming from what appeared to be an intrahepatic vessel longitudinally in the gallbladder bed.  Bleeding was coming from the edge of this vessel.  The LigaSure device was used to cauterize this area to good effect.  There was no ongoing bleeding.  We now proceeded to irrigate out the entire abdomen with copious amounts of normal saline, evacuating as much clot as possible.  There was minimal residual clot.  FloSeal was now placed into the gallbladder fossa and Surgicel Nu-Knit was placed over this.  A 15 Chinese round Americo drain was placed into the abdomen through one of the right-sided 5 mm ports.  This was sutured in place of the skin and the drain was laid across the gallbladder fossa.  The remaining trochars were removed and the supraumbilical fascia was closed using interrupted 0 Vicryl sutures.  Skin incisions were closed using 4-0 subcuticular Vicryl followed by Steri-Strips.    The patient tolerated the procedure well and was transferred to the recovery room in satisfactory condition.  Sponge and needle counts are correct at the close of the case.  Repeat hemoglobin  is pending.    Specimens: * No specimens in log *        Caleb Valle MD

## 2021-04-03 LAB
ANION GAP SERPL CALCULATED.3IONS-SCNC: <1 MMOL/L (ref 3–14)
BASOPHILS # BLD AUTO: 0 10E9/L (ref 0–0.2)
BASOPHILS NFR BLD AUTO: 0.5 %
BLD PROD TYP BPU: NORMAL
BLD UNIT ID BPU: 0
BLOOD PRODUCT CODE: NORMAL
BPU ID: NORMAL
BUN SERPL-MCNC: 11 MG/DL (ref 7–30)
CALCIUM SERPL-MCNC: 7.2 MG/DL (ref 8.5–10.1)
CHLORIDE SERPL-SCNC: 111 MMOL/L (ref 94–109)
CO2 SERPL-SCNC: 28 MMOL/L (ref 20–32)
CREAT SERPL-MCNC: 0.78 MG/DL (ref 0.52–1.04)
DIFFERENTIAL METHOD BLD: ABNORMAL
EOSINOPHIL # BLD AUTO: 0 10E9/L (ref 0–0.7)
EOSINOPHIL NFR BLD AUTO: 0.7 %
ERYTHROCYTE [DISTWIDTH] IN BLOOD BY AUTOMATED COUNT: 15.5 % (ref 10–15)
ERYTHROCYTE [DISTWIDTH] IN BLOOD BY AUTOMATED COUNT: 15.5 % (ref 10–15)
GFR SERPL CREATININE-BSD FRML MDRD: >90 ML/MIN/{1.73_M2}
GLUCOSE BLDC GLUCOMTR-MCNC: 119 MG/DL (ref 70–99)
GLUCOSE BLDC GLUCOMTR-MCNC: 136 MG/DL (ref 70–99)
GLUCOSE BLDC GLUCOMTR-MCNC: 146 MG/DL (ref 70–99)
GLUCOSE BLDC GLUCOMTR-MCNC: 182 MG/DL (ref 70–99)
GLUCOSE BLDC GLUCOMTR-MCNC: 194 MG/DL (ref 70–99)
GLUCOSE SERPL-MCNC: 145 MG/DL (ref 70–99)
HCT VFR BLD AUTO: 24.6 % (ref 35–47)
HCT VFR BLD AUTO: 24.9 % (ref 35–47)
HGB BLD-MCNC: 6.7 G/DL (ref 11.7–15.7)
HGB BLD-MCNC: 8.3 G/DL (ref 11.7–15.7)
HGB BLD-MCNC: 8.4 G/DL (ref 11.7–15.7)
IMM GRANULOCYTES # BLD: 0 10E9/L (ref 0–0.4)
IMM GRANULOCYTES NFR BLD: 0.5 %
INR PPP: 1.15 (ref 0.86–1.14)
LYMPHOCYTES # BLD AUTO: 1.5 10E9/L (ref 0.8–5.3)
LYMPHOCYTES NFR BLD AUTO: 27.4 %
MCH RBC QN AUTO: 29.9 PG (ref 26.5–33)
MCH RBC QN AUTO: 30.2 PG (ref 26.5–33)
MCHC RBC AUTO-ENTMCNC: 33.3 G/DL (ref 31.5–36.5)
MCHC RBC AUTO-ENTMCNC: 34.1 G/DL (ref 31.5–36.5)
MCV RBC AUTO: 89 FL (ref 78–100)
MCV RBC AUTO: 90 FL (ref 78–100)
MONOCYTES # BLD AUTO: 0.4 10E9/L (ref 0–1.3)
MONOCYTES NFR BLD AUTO: 6.6 %
NEUTROPHILS # BLD AUTO: 3.5 10E9/L (ref 1.6–8.3)
NEUTROPHILS NFR BLD AUTO: 64.3 %
NRBC # BLD AUTO: 0 10*3/UL
NRBC BLD AUTO-RTO: 0 /100
PLATELET # BLD AUTO: 128 10E9/L (ref 150–450)
PLATELET # BLD AUTO: 135 10E9/L (ref 150–450)
POTASSIUM SERPL-SCNC: 4.1 MMOL/L (ref 3.4–5.3)
RBC # BLD AUTO: 2.78 10E12/L (ref 3.8–5.2)
RBC # BLD AUTO: 2.78 10E12/L (ref 3.8–5.2)
SODIUM SERPL-SCNC: 138 MMOL/L (ref 133–144)
TRANSFUSION STATUS PATIENT QL: NORMAL
TRANSFUSION STATUS PATIENT QL: NORMAL
WBC # BLD AUTO: 5.5 10E9/L (ref 4–11)
WBC # BLD AUTO: 6.1 10E9/L (ref 4–11)

## 2021-04-03 PROCEDURE — 250N000011 HC RX IP 250 OP 636: Performed by: SURGERY

## 2021-04-03 PROCEDURE — 250N000013 HC RX MED GY IP 250 OP 250 PS 637: Performed by: PHYSICIAN ASSISTANT

## 2021-04-03 PROCEDURE — 80048 BASIC METABOLIC PNL TOTAL CA: CPT | Performed by: SURGERY

## 2021-04-03 PROCEDURE — 250N000013 HC RX MED GY IP 250 OP 250 PS 637: Performed by: SURGERY

## 2021-04-03 PROCEDURE — 999N001017 HC STATISTIC GLUCOSE BY METER IP

## 2021-04-03 PROCEDURE — 85018 HEMOGLOBIN: CPT | Performed by: SURGERY

## 2021-04-03 PROCEDURE — 85610 PROTHROMBIN TIME: CPT | Performed by: INTERNAL MEDICINE

## 2021-04-03 PROCEDURE — 250N000011 HC RX IP 250 OP 636: Performed by: INTERNAL MEDICINE

## 2021-04-03 PROCEDURE — 85025 COMPLETE CBC W/AUTO DIFF WBC: CPT | Performed by: INTERNAL MEDICINE

## 2021-04-03 PROCEDURE — P9016 RBC LEUKOCYTES REDUCED: HCPCS | Performed by: PHYSICIAN ASSISTANT

## 2021-04-03 PROCEDURE — 99233 SBSQ HOSP IP/OBS HIGH 50: CPT | Performed by: INTERNAL MEDICINE

## 2021-04-03 PROCEDURE — 120N000001 HC R&B MED SURG/OB

## 2021-04-03 PROCEDURE — 85027 COMPLETE CBC AUTOMATED: CPT | Performed by: INTERNAL MEDICINE

## 2021-04-03 PROCEDURE — 36415 COLL VENOUS BLD VENIPUNCTURE: CPT | Performed by: INTERNAL MEDICINE

## 2021-04-03 PROCEDURE — 85018 HEMOGLOBIN: CPT | Performed by: INTERNAL MEDICINE

## 2021-04-03 PROCEDURE — 36415 COLL VENOUS BLD VENIPUNCTURE: CPT | Performed by: SURGERY

## 2021-04-03 RX ADMIN — HYDROMORPHONE HYDROCHLORIDE 0.2 MG: 0.2 INJECTION, SOLUTION INTRAMUSCULAR; INTRAVENOUS; SUBCUTANEOUS at 21:52

## 2021-04-03 RX ADMIN — IBUPROFEN 600 MG: 600 TABLET ORAL at 09:57

## 2021-04-03 RX ADMIN — ACETAMINOPHEN 975 MG: 325 TABLET, FILM COATED ORAL at 21:52

## 2021-04-03 RX ADMIN — POTASSIUM CHLORIDE AND SODIUM CHLORIDE: 450; 150 INJECTION, SOLUTION INTRAVENOUS at 21:42

## 2021-04-03 RX ADMIN — HYDROMORPHONE HYDROCHLORIDE 0.2 MG: 0.2 INJECTION, SOLUTION INTRAMUSCULAR; INTRAVENOUS; SUBCUTANEOUS at 18:13

## 2021-04-03 RX ADMIN — HYDROMORPHONE HYDROCHLORIDE 0.2 MG: 0.2 INJECTION, SOLUTION INTRAMUSCULAR; INTRAVENOUS; SUBCUTANEOUS at 03:28

## 2021-04-03 RX ADMIN — ONDANSETRON 4 MG: 4 TABLET, ORALLY DISINTEGRATING ORAL at 18:12

## 2021-04-03 RX ADMIN — SIMETHICONE 80 MG: 80 TABLET, CHEWABLE ORAL at 03:41

## 2021-04-03 RX ADMIN — HYDROMORPHONE HYDROCHLORIDE 0.2 MG: 0.2 INJECTION, SOLUTION INTRAMUSCULAR; INTRAVENOUS; SUBCUTANEOUS at 09:57

## 2021-04-03 RX ADMIN — ACETAMINOPHEN 975 MG: 325 TABLET, FILM COATED ORAL at 03:28

## 2021-04-03 RX ADMIN — ACETAMINOPHEN 975 MG: 325 TABLET, FILM COATED ORAL at 14:21

## 2021-04-03 ASSESSMENT — ACTIVITIES OF DAILY LIVING (ADL)
ADLS_ACUITY_SCORE: 18

## 2021-04-03 NOTE — PLAN OF CARE
Tele SR. 2 DAYS POST OP following laprascopic surgery, has 4 incisions on abdomen as a result and a SUJATA drain. VSS ex soft bp, IV fluids infusing. A&Ox4, Assist of 2; did not get out of bed during noc shift. Suarez w good output, sujata drain 60 ml output. . Full liquid diet. LS clear. Received prn IV dilauded for 4/10 abd pain along with scheduled tylenol po.     Lab called with critical hgb of 6.7. MD paged. Passed off to day RN.

## 2021-04-03 NOTE — PROGRESS NOTES
[facility]  Hospitalist Progress Note  Patient Name: Jaymie Morales    MRN: [id number]  Provider: Brenton Gallego MD  04/03/21             Assessment and Plan:      Summary of Stay: Jaymie Morales is a 40 year old female with history of DM1 (managed with insulin pump), ADD, and history of recurrent syncope.  She was admitted to [facility] on 4/1/2021 for elective cholecystectomy for chronic cholecystitis.  Hospitalist team was consulted STAT for recurrent bradycardia, hypotension and syncope on the evening of 4/1.  Patient was found to have hemoperitoneum by CT of abdomen and acute blood loss anemia with HGB of 7.  She underwent urgent diagnostic laparoscopy and had evacuation of hemoperitoneum and ligation of hepatic vessel.  Blood transfusions were given.      Problem List/Assessment and Plan:      S/p elective cholecystectomy on 4/1/21  Hemoperitoneum with acute blood loss anemia  Hemorrhagic shock, resolved  S/p ugent laparoscopy with evacuation of hematoma and ligation of hepatic vessel  S/p transfusion of red blood cells  Anemia this am with dizziness  -Diet and post op cares per general surgery   -Pain control with Tylenol, Ibuprofen, Oxycodone or IV Dilaudid for severe pain  -Continue IV fluids  --Trasfuse 1 unit of RBC's today  --HGB every 8 hours until morning.     Type 1 diabetes  Uses insulin pump  -Continue patient managed insulin pump     ADD  -Continue to hold Adderall      Syncope, likely due to acute blood loss anemia  History of previous syncope  --Monitor vital signs with transfusions     Diet: Clear Liquid Diet    DVT Prophylaxis: Pneumatic Compression Devices - no chemical prophylaxis due to bleeding as above  Suarez Catheter: in place, indication: Anesthesia  Code Status: Full Code             Interval History:      Feels dizzy this morning. And hungry                  Physical Exam:      Last Vital Signs:  /55 (BP Location: Left arm)   Pulse 68   Temp 98.8  F (37.1  C)  "(Oral)   Resp 18   Ht 1.676 m (5' 6\")   Wt 58.5 kg (129 lb)   LMP 03/25/2021   SpO2 96%   BMI 20.82 kg/m      Intake/Output Summary (Last 24 hours) at 4/3/2021 1741  Last data filed at 4/3/2021 1615  Gross per 24 hour   Intake 3172 ml   Output 3180 ml   Net -8 ml       GENERAL:  Comfortable. Cooperative.  PSYCH: pleasant, oriented, No acute distress.  EYES: PERRLA, Normal conjunctiva.  HEART:  Regular rate and rhythm. No JVD. Pulses normal. No edema.  LUNGS:  Clear to auscultation, normal Respiratory effort.  ABDOMEN:  Soft, no hepatosplenomegaly, normal bowel sounds.  EXTREMETIES: No clubbing, cyanosis or ischemia  SKIN:  Dry to touch, No rash.           Medications:      All current medications were reviewed.         Data:      All new lab and imaging data was reviewed.   Labs:  No results for input(s): CULT in the last 168 hours.       Lab Results   Component Value Date     04/03/2021     04/01/2021     10/02/2020    Lab Results   Component Value Date    CHLORIDE 111 04/03/2021    CHLORIDE 107 04/01/2021    CHLORIDE 103 10/02/2020    Lab Results   Component Value Date    BUN 11 04/03/2021    BUN 17 04/01/2021    BUN 15 10/02/2020      Lab Results   Component Value Date    POTASSIUM 4.1 04/03/2021    POTASSIUM 4.1 04/01/2021    POTASSIUM 3.7 10/02/2020    Lab Results   Component Value Date    CO2 28 04/03/2021    CO2 28 04/01/2021    CO2 27 10/02/2020    Lab Results   Component Value Date    CR 0.78 04/03/2021    CR 0.83 04/01/2021    CR 0.80 10/02/2020        Recent Labs   Lab 04/03/21  0608 04/02/21  1538 04/02/21  0731 04/01/21  2120 04/01/21 2120 04/01/21  1534 04/01/21  1534   WBC  --   --  10.5  --  10.1  --  9.3   HGB 6.7* 7.7* 7.1*   < > 8.5*   < > 11.5*   HCT  --   --  20.1*  --  24.6*  --  34.1*   MCV  --   --  92  --  93  --  96   PLT  --   --  210  --  228  --  235    < > = values in this interval not displayed.              "

## 2021-04-03 NOTE — PLAN OF CARE
Slow to improve. Continues to have shoulder pain, getting dilaudid. The abdo pain is less and tolerating the pain with tylenol abd ibuprofen. +Bowel sounds, denies nausea, tolerating clears. Ok for full liquid for dinner. DM with own insulin pump. Recheck hgb after 1 unit RBC's was 7.7. Discharge 2-3 days.

## 2021-04-03 NOTE — PROVIDER NOTIFICATION
Patient doing well. Blood unit in. Passing gas, active bowel sounds, stood at bedside.    4 hour RN. Blood unit completed. Hgb returned 8.4. Plts slightly low.Personal insulin pump, documented units given.   MD notified: pt feeling increased pressure and pain. BOBBY stripped Q4 with slightly more clots. Surgery notified. Orders received. Continue to monitor.

## 2021-04-03 NOTE — PLAN OF CARE
1 day post op (cholecystectomy). AOx4, VSS. 96% on RA, Pain is 7/10. Suarez UOP. BOBBY draining, Bowel sounds: hyperactive, not passing gas yet. Advanced to Full liquid diet. N/V intermittent. Abdomen has incision at umbilicus with dressing. BOBBY dressing on right obliques. IV on left hand removed d/t redness and pain. Hb.7., pt feeling weak and tired, unable to ambulate. Planned discharge in 2-3 days once pain management is tolerated with PO medications, hgb is stable and no further syncope.

## 2021-04-03 NOTE — PROGRESS NOTES
"[facility]   General Surgery Progress Note           Assessment and Plan:   Assessment:   POD#2 s/p Procedure(s):  DIAGNOSTIC LAPAROSCOPY, EVACUATION OF HEMOPERITONEUM, LIGATION OF HEPATIC VESSEL  Hg a bit lower this AM, possible 3 rd space redistribution       Plan:   -will try sitting up to see if still dizzy/faint, ambulate with assist if able  If symptomatic when up - transfuse PRBC  Will check coags, follow Hg         Interval History:   Feels \"OK\", hasn't been sitting up since yesterday AM, tolerating liquids but no flatus yet  Discussed informally with cardiology - they recommend assuring she is not hypovolemic and allowing recovery from surgery/anesthesia. If still symptomatic formal consult by them may be needed.          Physical Exam:   Blood pressure 97/51, pulse 70, temperature 99  F (37.2  C), temperature source Oral, resp. rate 18, height 1.676 m (5' 6\"), weight 58.5 kg (129 lb), last menstrual period 03/25/2021, SpO2 96 %.    I/O last 3 completed shifts:  In: 1851 [P.O.:960; I.V.:541]  Out: 2240 [Urine:1900; Drains:340]    Abdomen:   soft, non-distended, tenderness noted at incsions    Inc(s) - clean, dry, intact      Americo - sanguinous, no clots          Data:     Recent Labs   Lab 04/03/21  0608 04/02/21  1538 04/02/21  0731 04/01/21  2120 04/01/21  2120 04/01/21  1534 04/01/21  1534   WBC  --   --  10.5  --  10.1  --  9.3   HGB 6.7* 7.7* 7.1*   < > 8.5*   < > 11.5*   HCT  --   --  20.1*  --  24.6*  --  34.1*   MCV  --   --  92  --  93  --  96   PLT  --   --  210  --  228  --  235    < > = values in this interval not displayed.       Heath Cutler MD     "

## 2021-04-03 NOTE — PLAN OF CARE
VSS. A/O x4. Dilaudid and ibuprofen given for abd pain, see MAR. 1 unit blood transfusing (hgb at 0700 was 6.7 will be rechecked this afternoon and q8 hours). BOBBY and Suarez in place. BOBBY draining large amount of bloody red drainage, 475 mL this shift, stripped q4 hours per orders. Suarez draining clear/light yellow urine. Tolerating full liquid diet well.Pt has insulin pump in place. Blood sugar this shift was 119 and 182. Will continue POC.

## 2021-04-04 LAB
BASOPHILS # BLD AUTO: 0 10E9/L (ref 0–0.2)
BASOPHILS NFR BLD AUTO: 0.4 %
DIFFERENTIAL METHOD BLD: ABNORMAL
EOSINOPHIL # BLD AUTO: 0.1 10E9/L (ref 0–0.7)
EOSINOPHIL NFR BLD AUTO: 2.1 %
ERYTHROCYTE [DISTWIDTH] IN BLOOD BY AUTOMATED COUNT: 15.5 % (ref 10–15)
GLUCOSE BLDC GLUCOMTR-MCNC: 100 MG/DL (ref 70–99)
GLUCOSE BLDC GLUCOMTR-MCNC: 190 MG/DL (ref 70–99)
GLUCOSE BLDC GLUCOMTR-MCNC: 228 MG/DL (ref 70–99)
GLUCOSE BLDC GLUCOMTR-MCNC: 73 MG/DL (ref 70–99)
GLUCOSE BLDC GLUCOMTR-MCNC: 96 MG/DL (ref 70–99)
HCT VFR BLD AUTO: 26.1 % (ref 35–47)
HGB BLD-MCNC: 8 G/DL (ref 11.7–15.7)
HGB BLD-MCNC: 8.6 G/DL (ref 11.7–15.7)
IMM GRANULOCYTES # BLD: 0 10E9/L (ref 0–0.4)
IMM GRANULOCYTES NFR BLD: 0.4 %
LYMPHOCYTES # BLD AUTO: 1.9 10E9/L (ref 0.8–5.3)
LYMPHOCYTES NFR BLD AUTO: 39.2 %
MCH RBC QN AUTO: 30.3 PG (ref 26.5–33)
MCHC RBC AUTO-ENTMCNC: 33 G/DL (ref 31.5–36.5)
MCV RBC AUTO: 92 FL (ref 78–100)
MONOCYTES # BLD AUTO: 0.3 10E9/L (ref 0–1.3)
MONOCYTES NFR BLD AUTO: 6.2 %
NEUTROPHILS # BLD AUTO: 2.5 10E9/L (ref 1.6–8.3)
NEUTROPHILS NFR BLD AUTO: 51.7 %
NRBC # BLD AUTO: 0 10*3/UL
NRBC BLD AUTO-RTO: 0 /100
PLATELET # BLD AUTO: 145 10E9/L (ref 150–450)
RBC # BLD AUTO: 2.84 10E12/L (ref 3.8–5.2)
WBC # BLD AUTO: 4.8 10E9/L (ref 4–11)

## 2021-04-04 PROCEDURE — 85018 HEMOGLOBIN: CPT | Performed by: INTERNAL MEDICINE

## 2021-04-04 PROCEDURE — 999N001017 HC STATISTIC GLUCOSE BY METER IP

## 2021-04-04 PROCEDURE — 250N000011 HC RX IP 250 OP 636: Performed by: SURGERY

## 2021-04-04 PROCEDURE — 250N000013 HC RX MED GY IP 250 OP 250 PS 637: Performed by: SURGERY

## 2021-04-04 PROCEDURE — 99222 1ST HOSP IP/OBS MODERATE 55: CPT | Performed by: INTERNAL MEDICINE

## 2021-04-04 PROCEDURE — 36415 COLL VENOUS BLD VENIPUNCTURE: CPT | Performed by: INTERNAL MEDICINE

## 2021-04-04 PROCEDURE — 99233 SBSQ HOSP IP/OBS HIGH 50: CPT | Performed by: INTERNAL MEDICINE

## 2021-04-04 PROCEDURE — 120N000001 HC R&B MED SURG/OB

## 2021-04-04 PROCEDURE — 36415 COLL VENOUS BLD VENIPUNCTURE: CPT | Performed by: SURGERY

## 2021-04-04 PROCEDURE — 85025 COMPLETE CBC W/AUTO DIFF WBC: CPT | Performed by: SURGERY

## 2021-04-04 PROCEDURE — 250N000013 HC RX MED GY IP 250 OP 250 PS 637: Performed by: PHYSICIAN ASSISTANT

## 2021-04-04 RX ADMIN — IBUPROFEN 600 MG: 600 TABLET ORAL at 19:53

## 2021-04-04 RX ADMIN — ACETAMINOPHEN 975 MG: 325 TABLET, FILM COATED ORAL at 05:52

## 2021-04-04 RX ADMIN — ACETAMINOPHEN 975 MG: 325 TABLET, FILM COATED ORAL at 13:44

## 2021-04-04 RX ADMIN — HYDROMORPHONE HYDROCHLORIDE 0.4 MG: 1 INJECTION, SOLUTION INTRAMUSCULAR; INTRAVENOUS; SUBCUTANEOUS at 00:40

## 2021-04-04 RX ADMIN — IBUPROFEN 600 MG: 600 TABLET ORAL at 10:54

## 2021-04-04 RX ADMIN — HYDROMORPHONE HYDROCHLORIDE 0.4 MG: 1 INJECTION, SOLUTION INTRAMUSCULAR; INTRAVENOUS; SUBCUTANEOUS at 10:55

## 2021-04-04 RX ADMIN — ONDANSETRON 4 MG: 4 TABLET, ORALLY DISINTEGRATING ORAL at 13:47

## 2021-04-04 RX ADMIN — HYDROMORPHONE HYDROCHLORIDE 0.4 MG: 1 INJECTION, SOLUTION INTRAMUSCULAR; INTRAVENOUS; SUBCUTANEOUS at 06:43

## 2021-04-04 RX ADMIN — ACETAMINOPHEN 975 MG: 325 TABLET, FILM COATED ORAL at 22:18

## 2021-04-04 RX ADMIN — SIMETHICONE 80 MG: 80 TABLET, CHEWABLE ORAL at 06:44

## 2021-04-04 ASSESSMENT — ACTIVITIES OF DAILY LIVING (ADL)
ADLS_ACUITY_SCORE: 18

## 2021-04-04 NOTE — PROGRESS NOTES
"[facility]   General Surgery Progress Note           Assessment and Plan:   Assessment:   POD#3 s/p Procedure(s):  CHOLECYSTECTOMY, LAPAROSCOPIC  Stable hemoglobin and vital signs when supine      Plan:   -Additional orders: Hgb in am.  Orthostatic VS now.  -Resume diet  -Up with assist  -Inpatient consult with cardiology, okay for tomorrow.         Interval History:   Has not been out of bed due to events of yesterday.  Made NPO again due to clots seen in the drain last night.         Physical Exam:   /67 (BP Location: Left arm)   Pulse 69   Temp 98  F (36.7  C) (Oral)   Resp 16   Ht 1.676 m (5' 6\")   Wt 58.5 kg (129 lb)   LMP 03/25/2021   SpO2 97%   BMI 20.82 kg/m       I/O last 3 completed shifts:  In: - 2212 cc  Out: 3490 cc    Abdomen:   soft, non-distended, tenderness noted in the right upper quadrant and hypoactive bowel sounds   Inc(s) - clean, dry, intact      Rob-Silverio - serosanguinous and and no clots, 70 ml last shift.          Data:     Hgb is 8.6 this am, following 8.0, 8.3 and 8.4 earlier.    Alondra Chapa MD     "

## 2021-04-04 NOTE — PLAN OF CARE
[id number]:  Pt and spouse anxious and expressing concerns d/t abdominal distention and BOBBY drain. Small clots noted in BOBBY drain but is now draining minimal output again. Active bowel sounds. Starting to pass gas again. Discussed w/ MD that pt should remain NPO until surgery sees in AM. Pt refused NPO status until after midnight d/t nausea and hunger pains. Given chicken broth and popsicle.  - given 3.2 units via insulin pump. Agrees to remain NPO after midnight. IVF's restarted. Pt refused to obtain 2nd IV access at this time. Scheduled Tylenol and IV Dilaudid given for pain 6/10. Suarez in place. VSS. Discussed w/ pt that MD has been in contact w/ surgery post labs and no further intervention is needed at this time. Spouse and pt expressed frustrations about pt needing to increase activity. Discussed their concerns and to plan on activity as tolerated. Pt and spouse agreeable to above plan.     Michelle Barney RN on 4/3/2021 at 10:25 PM

## 2021-04-04 NOTE — PLAN OF CARE
Tele SR. VSS. 3 days post op. A&Ox4. Did not get out of bed during noc but can shift weight indep. Kept NPO after midnight for surgery eval. 6/10 pain in abd; received PRN IV dilaudid x2 and scheduled tylenol. IVF infusing. BG 96. Got PRN simethecone for gas/cramping feeling. LS clear. BOBBY drain 70 ml total, 1 clot. Suarez with good output.

## 2021-04-04 NOTE — CONSULTS
"Cardiology Consultation      Jaymie Morales MRN# [id number]   YOB: 1980 Age: 40 year old   Date of Admission: 2021     Reason for consult:  Syncope           Assessment and Plan:     1. History of orthostasis, syncope    We discussed the pathophysiology of autonomic dysfunction and inappropriate vasodilatation.    Recommended that she try compression leggings as well as increasing her salt.  Currently she takes a very low salt diet.  If needed, could try Florinef or midodrine, but would prefer to do things through compression and diet initially.    Have given her follow-up contact information should she want to have ongoing discussions.    Please call with questions             Chief Complaint:   No chief complaint on file.           History of Present Illness:   This patient is a 40 year old female with longstanding history of syncope.  She is a type I diabetic but her symptoms preceded her diabetic diagnosis.  She has had previous work-up including tilt table testing.  No specific recommendations or therapies were advised.    She presents hypotensive and orthostasis due to abdominal bleed.  She is currently improving.    She and her  have questions about orthostasis and presyncope/syncope in general outside of this presentation.    She states she does have a prodrome.  They can happen at random times but seem to have a predilection for standing after being seated or sleeping/lying.  She can abort the episodes if she lays flat quickly enough.  She is normally on some Adderall.         Physical Exam:   Vitals were reviewed  Blood pressure 106/67, pulse 75, temperature 98.5  F (36.9  C), temperature source Oral, resp. rate 16, height 1.676 m (5' 6\"), weight 58.5 kg (129 lb), last menstrual period 2021, SpO2 97 %.  Temperatures:  Current - Temp: 98.5  F (36.9  C); Max - Temp  Av.6  F (37  C)  Min: 98  F (36.7  C)  Max: 99.1  F (37.3  C)  Respiration range: Resp  Av  Min: " 16  Max: 18  Pulse range: Pulse  Av.4  Min: 65  Max: 76  Blood pressure range: Systolic (24hrs), Av , Min:105 , Max:116   ; Diastolic (24hrs), Av, Min:55, Max:73    Pulse oximetry range: SpO2  Av.9 %  Min: 96 %  Max: 100 %    Intake/Output Summary (Last 24 hours) at 2021 1506  Last data filed at 2021 1109  Gross per 24 hour   Intake 1045 ml   Output 2200 ml   Net -1155 ml     Constitutional:   awake, alert, cooperative, no apparent distress, and appears stated age, thin     Eyes:   Lids and lashes normal, pupils equal, round and reactive to light, extra ocular muscles intact, sclera clear, conjunctiva normal     Neck:   supple, symmetrical, trachea midline,      Back:   symmetric     Lungs:   Symmetric     Cardiovascular:   Regular, no significant murmurs     Abdomen:   Postsurgical     Musculoskeletal:   motor strength is 5 out of 5 all extremities bilaterally     Neurologic:   Grossly nonfocal     Skin:   normal skin color, texture, turgor     Additional findings:                  Past Medical History:   I have reviewed this patient's past medical history  Past Medical History:   Diagnosis Date     Complication of anesthesia     Slow to wake after knee surgery     Diabetes (H)     Type 1             Past Surgical History:   I have reviewed this patient's past surgical history  Past Surgical History:   Procedure Laterality Date     ABDOMEN SURGERY       ARTHROSCOPY KNEE Right      LAPAROSCOPIC CHOLECYSTECTOMY WITH CHOLANGIOGRAMS N/A 2021    Procedure: LAPAROSCOPIC CHOLECYSTECTOMY WITH CHOLANGIOGRAMS;  Surgeon: Heath Cutler MD;  Location:  OR     LAPAROSCOPY DIAGNOSTIC (GENERAL) N/A 2021    Procedure: DIAGNOSTIC LAPAROSCOPY, EVACUATION OF HEMOPERITONEUM, LIGATION OF HEPATIC VESSEL;  Surgeon: Caleb Valle MD;  Location:  OR               Social History:   I have reviewed this patient's social history  Social History     Tobacco Use     Smoking status: Never Smoker      Smokeless tobacco: Never Used   Substance Use Topics     Alcohol use: Yes     Alcohol/week: 0.0 standard drinks     Comment: Socially             Family History:   I have reviewed this patient's family history  History reviewed. No pertinent family history.          Allergies:     Allergies   Allergen Reactions     Sulfa Drugs Unknown and Hives     Sulfonamide Derivatives      Sulfa (Sulfonamide Antibiotics)             Medications:   I have reviewed this patient's current medications  Medications Prior to Admission   Medication Sig Dispense Refill Last Dose     amphetamine-dextroamphetamine (ADDERALL) 20 MG tablet Take 10 mg by mouth daily Take at noon   3/31/2021 at Unknown time     amphetamine-dextroamphetamine (ADDERALL) 20 MG tablet Take 20 mg by mouth every morning   3/31/2021 at Unknown time     insulin aspart (NOVOLOG VIAL) 100 UNITS/ML vial Use with insulin pump        INSULIN PUMP - OUTPATIENT Date Last Updated: 2021  Omnipod  BASAL RATES and times:  12   AM (midnight): 0.8 units/hour    7     AM: 0.7 units/hour   CARB RATIO and times:  12   AM (midnight): 10  Corection Factor (Sensitivity) and times:  12   AM (midnight): 95 mg/dL  BLOOD GLUCOSE TARGET and times:  12   AM (midnight): 130  6     AM:  100  9    PM:  130  Active Insulin Time: 4 hours        Prenatal Multivit-Min-Fe-FA (PRE-MIRANDA FORMULA PO) Take 1 chew tab by mouth daily    3/31/2021 at Unknown time     acetone, Urine, test (KETOSTIX) STRP 1 strip by In Vitro route once as needed    Unknown at Unknown time     glucagon (GLUCAGON EMERGENCY) 1 MG injection 1 mg once as needed for low blood sugar    Unknown at Unknown time     [DISCONTINUED] INSULIN PUMP - OUTPATIENT Date last updated:  14  [employer] Minimed: Model 551  BASAL RATES and times:  12   AM (midnight): 0.3 units/hour    7     AM: 0.1 units/hour   8    PM: 0.25 units/hour   Basal Pattern A:  Jaymie Morales will use when N/A.  CARB RATIO and times:  12   AM (midnight):  12  Corection Factor (Sensitivity) and times:  12   AM (midnight): 95 mg/dL  BLOOD GLUCOSE TARGET and times:  12   AM (midnight): 90 - 120  6     AM:  80 - 110  9    PM:  90 - 120  Active Insulin Time:  4 hours  Sensor:  Yes: 12   AM (midnight): 65 - 200  Carelink / Diasend username:  [email or web address]  Carelink / Di[employer] Password:  [email or web address]        Current Facility-Administered Medications Ordered in Epic   Medication Dose Route Frequency Last Rate Last Admin     0.45% sodium chloride + KCl 20 mEq/L infusion   Intravenous Continuous   Stopped at 04/04/21 1200     0.9% sodium chloride BOLUS  1-250 mL Intravenous Q1H PRN         [START ON 4/5/2021] acetaminophen (TYLENOL) tablet 650 mg  650 mg Oral Q4H PRN         acetaminophen (TYLENOL) tablet 975 mg  975 mg Oral Q8H   975 mg at 04/04/21 1344     benzocaine-menthol (CHLORASEPTIC) 6-10 MG lozenge 1 lozenge  1 lozenge Buccal Q1H PRN         bisacodyl (DULCOLAX) Suppository 10 mg  10 mg Rectal Daily PRN         glucose gel 15-30 g  15-30 g Oral Q15 Min PRN        Or     dextrose 50 % injection 25-50 mL  25-50 mL Intravenous Q15 Min PRN        Or     glucagon injection 1 mg  1 mg Subcutaneous Q15 Min PRN         docusate sodium (COLACE) capsule 100 mg  100 mg Oral BID PRN         fentaNYL (PF) (SUBLIMAZE) injection 50 mcg  50 mcg Intravenous Q10 Min PRN         hemostatic matrix (FLOSEAL) kit    PRN   1 kit at 04/02/21 0035     HYDROmorphone (DILAUDID) injection 0.2 mg  0.2 mg Intravenous Q2H PRN   0.2 mg at 04/03/21 2152    Or     HYDROmorphone (PF) (DILAUDID) injection 0.4 mg  0.4 mg Intravenous Q2H PRN   0.4 mg at 04/04/21 1055     ibuprofen (ADVIL/MOTRIN) tablet 600 mg  600 mg Oral Q6H PRN   600 mg at 04/04/21 1054     insulin aspart (NovoLOG/FIASP) 100 UNIT/ML VIAL FOR FILLING PUMP RESERVOIR   Device See Admin Instructions         insulin basal rate from AMBULATORY PUMP   Subcutaneous Continuous   New Bag at 04/02/21 1121     insulin bolus from AMBULATORY PUMP    Subcutaneous 4x Daily AC & HS   3.2 Units at 04/03/21 2156     insulin bolus from AMBULATORY PUMP   Subcutaneous TID AC   2.4 Units at 04/03/21 1050     insulin bolus from AMBULATORY PUMP   Subcutaneous With Snacks or Supplements         lactated ringers BOLUS 1,000 mL  1,000 mL Intravenous Once         lidocaine (LMX4) cream   Topical Q1H PRN         lidocaine 1 % 0.1-1 mL  0.1-1 mL Other Q1H PRN         magnesium hydroxide (MILK OF MAGNESIA) suspension 30 mL  30 mL Oral Daily PRN         naloxone (NARCAN) injection 0.2 mg  0.2 mg Intravenous Q2 Min PRN        Or     naloxone (NARCAN) injection 0.4 mg  0.4 mg Intravenous Q2 Min PRN        Or     naloxone (NARCAN) injection 0.2 mg  0.2 mg Intramuscular Q2 Min PRN        Or     naloxone (NARCAN) injection 0.4 mg  0.4 mg Intramuscular Q2 Min PRN         ondansetron (ZOFRAN-ODT) ODT tab 4 mg  4 mg Oral Q6H PRN   4 mg at 04/04/21 1347    Or     ondansetron (ZOFRAN) injection 4 mg  4 mg Intravenous Q6H PRN         oxyCODONE (ROXICODONE) tablet 5 mg  5 mg Oral Q4H PRN        Or     oxyCODONE (ROXICODONE) tablet 10 mg  10 mg Oral Q4H PRN         polyethylene glycol (MIRALAX) Packet 17 g  17 g Oral Daily PRN         prochlorperazine (COMPAZINE) injection 10 mg  10 mg Intravenous Q6H PRN        Or     prochlorperazine (COMPAZINE) tablet 10 mg  10 mg Oral Q6H PRN         senna-docusate (SENOKOT-S/PERICOLACE) 8.6-50 MG per tablet 1 tablet  1 tablet Oral At Bedtime PRN         simethicone (MYLICON) chewable tablet 80 mg  80 mg Oral Q6H PRN   80 mg at 04/04/21 0644     sodium chloride (PF) 0.9% PF flush 3 mL  3 mL Intracatheter Q8H   3 mL at 04/03/21 2153     sodium chloride (PF) 0.9% PF flush 3 mL  3 mL Intracatheter Q1H PRN         sodium chloride (PF) 0.9% PF flush 3 mL  3 mL Intracatheter q1 min prn   3 mL at 04/04/21 0040     sodium phosphate (FLEET ENEMA) 1 enema  1 enema Rectal Once PRN         Current Outpatient Medications Ordered in Epic   Medication     ondansetron  (ZOFRAN) 4 MG tablet     oxyCODONE (ROXICODONE) 5 MG tablet             Review of Systems:   The 10 point Review of Systems is negative other than noted in the HPI            Data:   All laboratory data reviewed  Results for orders placed or performed during the hospital encounter of 04/01/21 (from the past 24 hour(s))   Glucose by meter   Result Value Ref Range    Glucose 194 (H) 70 - 99 mg/dL   CBC with platelets differential   Result Value Ref Range    WBC 5.5 4.0 - 11.0 10e9/L    RBC Count 2.78 (L) 3.8 - 5.2 10e12/L    Hemoglobin 8.4 (L) 11.7 - 15.7 g/dL    Hematocrit 24.6 (L) 35.0 - 47.0 %    MCV 89 78 - 100 fl    MCH 30.2 26.5 - 33.0 pg    MCHC 34.1 31.5 - 36.5 g/dL    RDW 15.5 (H) 10.0 - 15.0 %    Platelet Count 135 (L) 150 - 450 10e9/L    Diff Method Automated Method     % Neutrophils 64.3 %    % Lymphocytes 27.4 %    % Monocytes 6.6 %    % Eosinophils 0.7 %    % Basophils 0.5 %    % Immature Granulocytes 0.5 %    Nucleated RBCs 0 0 /100    Absolute Neutrophil 3.5 1.6 - 8.3 10e9/L    Absolute Lymphocytes 1.5 0.8 - 5.3 10e9/L    Absolute Monocytes 0.4 0.0 - 1.3 10e9/L    Absolute Eosinophils 0.0 0.0 - 0.7 10e9/L    Absolute Basophils 0.0 0.0 - 0.2 10e9/L    Abs Immature Granulocytes 0.0 0 - 0.4 10e9/L    Absolute Nucleated RBC 0.0    INR   Result Value Ref Range    INR 1.15 (H) 0.86 - 1.14   CBC with platelets   Result Value Ref Range    WBC 6.1 4.0 - 11.0 10e9/L    RBC Count 2.78 (L) 3.8 - 5.2 10e12/L    Hemoglobin 8.3 (L) 11.7 - 15.7 g/dL    Hematocrit 24.9 (L) 35.0 - 47.0 %    MCV 90 78 - 100 fl    MCH 29.9 26.5 - 33.0 pg    MCHC 33.3 31.5 - 36.5 g/dL    RDW 15.5 (H) 10.0 - 15.0 %    Platelet Count 128 (L) 150 - 450 10e9/L   Glucose by meter   Result Value Ref Range    Glucose 136 (H) 70 - 99 mg/dL   Hemoglobin   Result Value Ref Range    Hemoglobin 8.0 (L) 11.7 - 15.7 g/dL   Glucose by meter   Result Value Ref Range    Glucose 96 70 - 99 mg/dL   CBC with platelets differential   Result Value Ref Range     WBC 4.8 4.0 - 11.0 10e9/L    RBC Count 2.84 (L) 3.8 - 5.2 10e12/L    Hemoglobin 8.6 (L) 11.7 - 15.7 g/dL    Hematocrit 26.1 (L) 35.0 - 47.0 %    MCV 92 78 - 100 fl    MCH 30.3 26.5 - 33.0 pg    MCHC 33.0 31.5 - 36.5 g/dL    RDW 15.5 (H) 10.0 - 15.0 %    Platelet Count 145 (L) 150 - 450 10e9/L    Diff Method Automated Method     % Neutrophils 51.7 %    % Lymphocytes 39.2 %    % Monocytes 6.2 %    % Eosinophils 2.1 %    % Basophils 0.4 %    % Immature Granulocytes 0.4 %    Nucleated RBCs 0 0 /100    Absolute Neutrophil 2.5 1.6 - 8.3 10e9/L    Absolute Lymphocytes 1.9 0.8 - 5.3 10e9/L    Absolute Monocytes 0.3 0.0 - 1.3 10e9/L    Absolute Eosinophils 0.1 0.0 - 0.7 10e9/L    Absolute Basophils 0.0 0.0 - 0.2 10e9/L    Abs Immature Granulocytes 0.0 0 - 0.4 10e9/L    Absolute Nucleated RBC 0.0    Glucose by meter   Result Value Ref Range    Glucose 73 70 - 99 mg/dL   Glucose by meter   Result Value Ref Range    Glucose 100 (H) 70 - 99 mg/dL       No results found for: CHOL  No results found for: HDL  Lab Results   Component Value Date    LDL 78 09/19/2015     No results found for: TRIG  No results found for: CHOLHDLRATIO  TSH   Date Value Ref Range Status   04/01/2021 0.48 0.40 - 4.00 mU/L Final

## 2021-04-04 NOTE — PLAN OF CARE
"9:20 Text page sent to MD, \"Pt hemoglobin 8.6 this morning.  Blood sugar is 73.  Is it ok to remove NPO and resume diet?  Thanks\"    14:30  POD #3 lap brunilda. Pt has c/o of intermittent headache and abdominal pain.  PRN dilaudid / Ibuprofen and scheduled tylenol given.  Alert and oriented.  Stood at side of bed today (assist of 1-2), took a few steps.  Had pain at BOBBY site with movement.  Lungs are clear.  97% RA.Tele: SR. BOBBY drain right side abdomen draining lap brunilda site.  Bright red drainage.  No clots.  Tubing stripped q 4 hrs per orders.  Dressing changed.  Cons carb diet resumed.  Blood sugars 73 and 100 this shift.  Pt has own insulin pump and manages it. Suarez discontinued this afternoon.  She is still due to void. PIV saline locked. Surgery following.  Cardiology to consult for hx of syncope. 1 U blood given 4/2 and 4/3.  Hemoglobin 8.6 today.  "

## 2021-04-04 NOTE — PROGRESS NOTES
Called to start IV, patient refused placement of second IV at this time. Is requesting that it not be placed until [facility] draw due at soon and results would require a blood transfusion.  Agreed to wait until requested .

## 2021-04-04 NOTE — PROGRESS NOTES
[facility]  Hospitalist Progress Note  Patient Name: Jaymie Morales    MRN: [id number]  Provider: Brenton Gallego MD  04/04/21             Assessment and Plan:      Summary of Stay: Jaymie Morales is a 40 year old female with history of DM1 (managed with insulin pump), ADD, and history of recurrent syncope.  She was admitted to [facility] on 4/1/2021 for elective cholecystectomy for chronic cholecystitis.  Hospitalist team was consulted STAT for recurrent bradycardia, hypotension and syncope on the evening of 4/1.  Patient was found to have hemoperitoneum by CT of abdomen and acute blood loss anemia with HGB of 7.  She underwent urgent diagnostic laparoscopy on 4/2/21 and had evacuation of hemoperitoneum and ligation of hepatic vessel.  Blood transfusions were given.      Problem List/Assessment and Plan:      S/p elective cholecystectomy on 4/1/21  Hemoperitoneum with acute blood loss anemia  Hemorrhagic shock, resolved  S/p ugent laparoscopy with evacuation of hematoma and ligation of hepatic vessel on 4/2/21  Acute blood loss anemia, symptomatic with dizziness  S/p transfusion of red blood cells  -Diet and post op cares per general surgery (diabetic diet now)  -Pain control with Tylenol, Ibuprofen, Oxycodone or IV Dilaudid for severe pain  -Stop IV fluids as tolerating diet  -AM HGB  -Still has drain in place     Type 1 diabetes  Uses insulin pump  -Continue patient managed insulin pump     ADD  -Continue to hold Adderall      Syncope, likely due to acute blood loss anemia  History of previous syncope  -Surgery consulted cardiology and Dr. Fernandes reviewed prior work up, recommending compression stockings and high salt diet with consideration of Florinef down the road if symptoms persist  -Monitor vital signs with transfusions  -discharge telemetry per patient request     Diet: Diabetic diet    DVT Prophylaxis: Pneumatic Compression Devices and ambulation  Code Status: Full Code    Disposition:   "Possibly home tomorrow? If ok with surgery          Interval History:      Feeling better today- weak but enjoying being up a bit more. Tolerating diet.                   Physical Exam:      Last Vital Signs:  /55 (BP Location: Left arm)   Pulse 75   Temp 97.9  F (36.6  C) (Oral)   Resp 20   Ht 1.676 m (5' 6\")   Wt 58.5 kg (129 lb)   LMP 03/25/2021   SpO2 99%   BMI 20.82 kg/m      Intake/Output Summary (Last 24 hours) at 4/4/2021 1749  Last data filed at 4/4/2021 1533  Gross per 24 hour   Intake 740 ml   Output 2250 ml   Net -1510 ml       GENERAL:  Comfortable. Cooperative.  PSYCH: pleasant, oriented, No acute distress.  EYES: PERRLA, Normal conjunctiva.  HEART:  Regular rate and rhythm. No JVD. Pulses normal. No edema.  LUNGS:  Clear to auscultation, normal Respiratory effort.  ABDOMEN:  Soft, no hepatosplenomegaly, normal bowel sounds.  EXTREMETIES: No clubbing, cyanosis or ischemia  SKIN:  Dry to touch, No rash.           Medications:      All current medications were reviewed.         Data:      All new lab and imaging data was reviewed.   Labs:       Lab Results   Component Value Date     04/03/2021     04/01/2021     10/02/2020    Lab Results   Component Value Date    CHLORIDE 111 04/03/2021    CHLORIDE 107 04/01/2021    CHLORIDE 103 10/02/2020    Lab Results   Component Value Date    BUN 11 04/03/2021    BUN 17 04/01/2021    BUN 15 10/02/2020      Lab Results   Component Value Date    POTASSIUM 4.1 04/03/2021    POTASSIUM 4.1 04/01/2021    POTASSIUM 3.7 10/02/2020    Lab Results   Component Value Date    CO2 28 04/03/2021    CO2 28 04/01/2021    CO2 27 10/02/2020    Lab Results   Component Value Date    CR 0.78 04/03/2021    CR 0.83 04/01/2021    CR 0.80 10/02/2020        Recent Labs   Lab 04/04/21  0633 04/04/21  0115 04/03/21 2019 04/03/21  1808   WBC 4.8  --  6.1 5.5   HGB 8.6* 8.0* 8.3* 8.4*   HCT 26.1*  --  24.9* 24.6*   MCV 92  --  90 89   *  --  128* 135*    "

## 2021-04-05 LAB
GLUCOSE BLDC GLUCOMTR-MCNC: 172 MG/DL (ref 70–99)
GLUCOSE BLDC GLUCOMTR-MCNC: 176 MG/DL (ref 70–99)
GLUCOSE BLDC GLUCOMTR-MCNC: 217 MG/DL (ref 70–99)
HGB BLD-MCNC: 7.9 G/DL (ref 11.7–15.7)
HGB BLD-MCNC: 8.2 G/DL (ref 11.7–15.7)
INTERPRETATION ECG - MUSE: NORMAL
INTERPRETATION ECG - MUSE: NORMAL

## 2021-04-05 PROCEDURE — 250N000012 HC RX MED GY IP 250 OP 636 PS 637: Performed by: INTERNAL MEDICINE

## 2021-04-05 PROCEDURE — 85018 HEMOGLOBIN: CPT | Performed by: SURGERY

## 2021-04-05 PROCEDURE — 250N000013 HC RX MED GY IP 250 OP 250 PS 637: Performed by: SURGERY

## 2021-04-05 PROCEDURE — 99232 SBSQ HOSP IP/OBS MODERATE 35: CPT | Performed by: INTERNAL MEDICINE

## 2021-04-05 PROCEDURE — 250N000013 HC RX MED GY IP 250 OP 250 PS 637: Performed by: PHYSICIAN ASSISTANT

## 2021-04-05 PROCEDURE — 36415 COLL VENOUS BLD VENIPUNCTURE: CPT | Performed by: SURGERY

## 2021-04-05 PROCEDURE — 120N000001 HC R&B MED SURG/OB

## 2021-04-05 PROCEDURE — 999N001017 HC STATISTIC GLUCOSE BY METER IP

## 2021-04-05 RX ORDER — BISACODYL 10 MG
10 SUPPOSITORY, RECTAL RECTAL ONCE
Status: COMPLETED | OUTPATIENT
Start: 2021-04-05 | End: 2021-04-05

## 2021-04-05 RX ADMIN — INSULIN ASPART 1 VIAL: 100 INJECTION, SOLUTION INTRAVENOUS; SUBCUTANEOUS at 14:53

## 2021-04-05 RX ADMIN — POLYETHYLENE GLYCOL 3350 17 G: 17 POWDER, FOR SOLUTION ORAL at 10:39

## 2021-04-05 RX ADMIN — ACETAMINOPHEN 650 MG: 325 TABLET, FILM COATED ORAL at 20:05

## 2021-04-05 RX ADMIN — IBUPROFEN 600 MG: 600 TABLET ORAL at 22:38

## 2021-04-05 RX ADMIN — IBUPROFEN 600 MG: 600 TABLET ORAL at 18:07

## 2021-04-05 RX ADMIN — IBUPROFEN 600 MG: 600 TABLET ORAL at 08:48

## 2021-04-05 RX ADMIN — BISACODYL 10 MG: 10 SUPPOSITORY RECTAL at 13:44

## 2021-04-05 RX ADMIN — ACETAMINOPHEN 650 MG: 325 TABLET, FILM COATED ORAL at 07:00

## 2021-04-05 RX ADMIN — IBUPROFEN 600 MG: 600 TABLET ORAL at 02:57

## 2021-04-05 RX ADMIN — ACETAMINOPHEN 650 MG: 325 TABLET, FILM COATED ORAL at 16:10

## 2021-04-05 ASSESSMENT — ACTIVITIES OF DAILY LIVING (ADL)
ADLS_ACUITY_SCORE: 18

## 2021-04-05 NOTE — PLAN OF CARE
VSS on RA. LS clear, bowel sounds audible. Reporting some discomfort due to bloating. Passing flatus. BOBBY site dressing changed x2. Bright red drainage, 80 ml out over last 8 hours. Abdominal pain 4-6/10, managed with scheduled tylenol and prn ibuprofen. T-pump ordered for back soreness. Voiding adequately, up to bathroom with A1, walker.  at bedside assisting with cares. Blood glucose at bedtime 190. Pt manages own insulin pump, reports bolus/basal doses, see MAR.

## 2021-04-05 NOTE — PROGRESS NOTES
"[facility]   General Surgery Progress Note         Assessment and Plan:   Assessment:   POD#4 s/p Procedure(s):  CHOLECYSTECTOMY, LAPAROSCOPIC  Postoperative ileus as expected      Plan:   -Low fat diet  -Bowel program: dulcolax supp and miralax  -Up with assist  -Monitor drain output  -Cardiology consulted, cleared  -Hospitalist following medical issues  -Disposition: Possible discharge in the next day if continues to improve         Interval History:   Sitting up in chair. Had some new onset of abdominal pain last night. Feels full after eating along with increased abdominal pain. She is passing flatus and has an appetite. She has not had a BM since the day of surgery. We discussed ileus. She has not been up walking yet but plans to do so today if allowed. They are worried about the amount of drain output and the color. We had a long discussion about drains and fluid and clearing of blood within the tissues (aftger a hematoma washout).          Physical Exam:   /62 (BP Location: Left arm)   Pulse 68   Temp 97.5  F (36.4  C) (Oral)   Resp 18   Ht 1.676 m (5' 6\")   Wt 58.5 kg (129 lb)   LMP 03/25/2021   SpO2 100%   BMI 20.82 kg/m         Abdomen:   soft, non-distended, tenderness noted in the right upper quadrant and hypoactive bowel sounds   Inc(s) - clean, dry, intact      Rob-Silverio - serosanguinous and no clots, 80 ml last shift.          Data:     Recent Labs   Lab 04/05/21  0643 04/04/21  0633 04/04/21  0115   HGB 7.9* 8.6* 8.0*       Ean Jarrell PA-C       As above, improving   Seems to have some degree of ileus (from hemoperitoneum)   Passed gas late this AM, distension better,   No compression stockings (recommended by cardiology) will order  recheck Hg tonite and tomorrow AM    Heath Cutler MD  4/5/2021 12:31 PM    "

## 2021-04-05 NOTE — PROGRESS NOTES
[facility]  Hospitalist Progress Note  Patient Name: Jaymie Morales    MRN: [id number]  Provider: Mariama Keenan MD             Assessment and Plan:      Summary of Stay: Jaymie Morales is a 40 year old female with history of DM1 (managed with insulin pump), ADD, and history of recurrent syncope.  She was admitted to [facility] on 4/1/2021 for elective cholecystectomy for chronic cholecystitis.  Hospitalist team was consulted STAT for recurrent bradycardia, hypotension and syncope on the evening of 4/1.  Patient was found to have hemoperitoneum by CT of abdomen and acute blood loss anemia with HGB of 7.  She underwent urgent diagnostic laparoscopy on 4/2/21 and had evacuation of hemoperitoneum and ligation of hepatic vessel.  Blood transfusions were given.      Problem List/Assessment and Plan:      S/p elective cholecystectomy on 4/1/21  Hemoperitoneum with acute blood loss anemia  Hemorrhagic shock, resolved  S/p ugent laparoscopy with evacuation of hematoma and ligation of hepatic vessel on 4/2/21  Acute blood loss anemia, symptomatic with dizziness  S/p transfusion of red blood cells  -Diet advanced to low-fat diet per surgery  -Pain control with Tylenol, Ibuprofen, Oxycodone or IV Dilaudid for severe pain  -We will monitor drain output     Type 1 diabetes  --Uses insulin pump  -Continue patient managed insulin pump     ADD  -Continue to hold Adderall      Syncope, likely due to acute blood loss anemia  History of previous syncope  -Surgery consulted cardiology and Dr. Fernandes reviewed prior work up, recommending compression stockings and high salt diet with consideration of [place] down the road if symptoms persist  -discharge telemetry per patient request     Diet: Diabetic diet    DVT Prophylaxis: Pneumatic Compression Devices and ambulation  Code Status: Full Code    Disposition:  When ok with surgery        Interval History:      Feeling better today- she stated that she still hasn't moved  "her bowel. Denies fever                  Physical Exam:      Last Vital Signs:  /62 (BP Location: Left arm)   Pulse 68   Temp 97.5  F (36.4  C) (Oral)   Resp 18   Ht 1.676 m (5' 6\")   Wt 58.5 kg (129 lb)   LMP 03/25/2021   SpO2 100%   BMI 20.82 kg/m      Intake/Output Summary (Last 24 hours) at 4/4/2021 1749  Last data filed at 4/4/2021 1533  Gross per 24 hour   Intake 740 ml   Output 2250 ml   Net -1510 ml       GENERAL:  Comfortable. Cooperative.  PSYCH: pleasant, oriented, No acute distress.  EYES: PERRLA, Normal conjunctiva.  HEART:  Regular rate and rhythm. No JVD. Pulses normal. No edema.  LUNGS:  Clear to auscultation, normal Respiratory effort.  ABDOMEN:  Soft, no hepatosplenomegaly, normal bowel sounds.  EXTREMETIES: No clubbing, cyanosis or ischemia  SKIN:  Dry to touch, No rash.           Medications:      All current medications were reviewed.         Data:      All new lab and imaging data was reviewed.   Labs:       Lab Results   Component Value Date     04/03/2021     04/01/2021     10/02/2020    Lab Results   Component Value Date    CHLORIDE 111 04/03/2021    CHLORIDE 107 04/01/2021    CHLORIDE 103 10/02/2020    Lab Results   Component Value Date    BUN 11 04/03/2021    BUN 17 04/01/2021    BUN 15 10/02/2020      Lab Results   Component Value Date    POTASSIUM 4.1 04/03/2021    POTASSIUM 4.1 04/01/2021    POTASSIUM 3.7 10/02/2020    Lab Results   Component Value Date    CO2 28 04/03/2021    CO2 28 04/01/2021    CO2 27 10/02/2020    Lab Results   Component Value Date    CR 0.78 04/03/2021    CR 0.83 04/01/2021    CR 0.80 10/02/2020        Recent Labs   Lab 04/05/21  0643 04/04/21  0633 04/04/21  0115 04/03/21 2019 04/03/21  1808   WBC  --  4.8  --  6.1 5.5   HGB 7.9* 8.6* 8.0* 8.3* 8.4*   HCT  --  26.1*  --  24.9* 24.6*   MCV  --  92  --  90 89   PLT  --  145*  --  128* 135*              "

## 2021-04-05 NOTE — PLAN OF CARE
VSS on RA. SBA. Tylenol and Ibuprofen given for pain, see MAR. BOBBY dressing changed, dark red drainage from BOBBY. SteriStrips to abdominal incisions. Ambulated in the munoz x2 this shift, with . BM noted after suppository. , 176 and 217 Managed with pt's own Insulin pump. Heat applied to back for comfort. Plan to discharge home possibly tomorrow. Will continue POC.

## 2021-04-06 VITALS
HEIGHT: 66 IN | OXYGEN SATURATION: 97 % | DIASTOLIC BLOOD PRESSURE: 74 MMHG | SYSTOLIC BLOOD PRESSURE: 123 MMHG | BODY MASS INDEX: 20.73 KG/M2 | TEMPERATURE: 98.7 F | WEIGHT: 129 LBS | RESPIRATION RATE: 20 BRPM | HEART RATE: 78 BPM

## 2021-04-06 LAB
GLUCOSE BLDC GLUCOMTR-MCNC: 126 MG/DL (ref 70–99)
GLUCOSE BLDC GLUCOMTR-MCNC: 80 MG/DL (ref 70–99)
GLUCOSE BLDC GLUCOMTR-MCNC: 83 MG/DL (ref 70–99)
HGB BLD-MCNC: 7.7 G/DL (ref 11.7–15.7)

## 2021-04-06 PROCEDURE — 250N000013 HC RX MED GY IP 250 OP 250 PS 637: Performed by: SURGERY

## 2021-04-06 PROCEDURE — 999N001017 HC STATISTIC GLUCOSE BY METER IP

## 2021-04-06 PROCEDURE — 36415 COLL VENOUS BLD VENIPUNCTURE: CPT | Performed by: SURGERY

## 2021-04-06 PROCEDURE — 85018 HEMOGLOBIN: CPT | Performed by: SURGERY

## 2021-04-06 RX ADMIN — IBUPROFEN 600 MG: 600 TABLET ORAL at 06:42

## 2021-04-06 RX ADMIN — ACETAMINOPHEN 650 MG: 325 TABLET, FILM COATED ORAL at 02:06

## 2021-04-06 RX ADMIN — ACETAMINOPHEN 650 MG: 325 TABLET, FILM COATED ORAL at 08:21

## 2021-04-06 ASSESSMENT — ACTIVITIES OF DAILY LIVING (ADL)
ADLS_ACUITY_SCORE: 18

## 2021-04-06 NOTE — PROGRESS NOTES
"[facility]   General Surgery Progress Note         Assessment and Plan:   Assessment:   POD#5 s/p Procedure(s):  CHOLECYSTECTOMY, LAPAROSCOPIC  Postoperative ileus as expected - resolving  Hg stabilized      Plan:   -Low fat diet  -Bowel program: dulcolax supp prn and miralax  -Pain Mgmt: ibuprofen and tylenol  -Up with assist  -Monitor drain output  -Cardiology consulted, cleared  -Hospitalist following medical issues  -Disposition: Plan to discharge this afternoon. Follow up Hg in [place] by the end of the week. Discharge instructions reviewed and are in chart. She plans to f/u with her primary care provider in the next week. She does not want any pain medications.         Interval History:   Standing in room, doing well. She felt great yesterday but did have some back pain and abdominal discomfort last night. This morning she has been up walking around. She had two BMs and feels less bloated. Has an appetite but gets full quickly as expected. Tolerating small meals. Voiding independently. Pain is controlled with ibuprofen and tylenol. She wants to go home. She is able to follow up in her town of [place].          Physical Exam:   /74 (BP Location: Left arm)   Pulse 78   Temp 98.7  F (37.1  C) (Oral)   Resp 20   Ht 1.676 m (5' 6\")   Wt 58.5 kg (129 lb)   LMP 03/25/2021   SpO2 97%   BMI 20.82 kg/m         Abdomen:   soft, non-distended, mild tenderness noted in the right upper quadrant and +bowel sounds   Inc(s) - clean, dry, intact with steristrips, no erythema      Rob-Silverio - serosanguinous and no clots, removed without complications.          Data:     Recent Labs   Lab 04/06/21  0625 04/05/21  1739 04/05/21  0643   HGB 7.7* 8.2* 7.9*       Ean Jarrell PA-C           "

## 2021-04-06 NOTE — PROVIDER NOTIFICATION
Pt. lost IV access. Refusing new IV placement. Not on any IV med. Is it okay to leave IV out tonight? Would  you please place Pt order? thanks.

## 2021-04-06 NOTE — PLAN OF CARE
A/O. VSS on RA. Pain rated 5-6/10 to abdominal surgical sites and back. PRN Tylenol and ibuprofen provided.. BOBBY dressing changed x 2. Dark red output from BOBBY. 0200 B. Pt manages insulin pump. Hgb back at 7.7. Possible discharge today. Will continue with POC.

## 2021-04-06 NOTE — PLAN OF CARE
AVS reviewed with patient. VSS.  Patient seen by GI MD prior to discharge.  All questions answered and patient denies any further questions or concerns.  PIV removed with no complications.  All belongings returned and patient escorted to front door by [facility] staff.

## 2021-04-07 NOTE — DISCHARGE SUMMARY
[facility]    Discharge Summary  Surgery    Date of Admission:  4/1/2021  Date of Discharge:  4/6/2021 12:05 PM  Discharging Provider: Ean Jarrell PA-C  Discharge Summary Note completed by: Ana Martinez PA-C on 4/7/2021  Date of Service: The patient was personally seen by Discharging Providers on the day of discharge.    Discharge Diagnoses   -Chronic cholecystitis with adhesions of liver to the diaphragm  -4/1/21: Laparoscopic Cholecystectomy with fluoroscopic intra-operative cholangiogram  -Pathology: Mild chronic cholecystitis, no stones  -Postoperative syncopal episodes; acute on chronic issue  -Postoperative bleed from hepatic/GB fossa  -4/2/21 (1am): Laparoscopy with evacuation of hemoperitoneum and ligation of hepatic bleeding vessels, drain placement  -Acute blood loss anemia requiring transfusion; improved and stabilized prior to release  -Expected postoperative ileus; resolved prior to release    Procedure/Surgery Information   4/1/21: Laparoscopic Cholecystectomy with fluoroscopic intra-operative cholangiogram  4/2/21 (1am): Laparoscopy with evacuation of hemoperitoneum and ligation of hepatic bleeding vessels, drain placement    History of Present Illness   Jaymie Morales is a 40 year old female who has been experiencing episodes of epigastric and RUQ abdominal pain.  Abdominal imaging has revealed a dramatically reduced gallbladder ejection fraction.  She now presents for laparoscopic cholecystectomy after having risks and benefits reviewed in detail.  We specifically discussed the possibility that her symptoms will not be relieved with cholecystectomy, as she has no gallstones on ultrasound.     Hospital Course   Jaymie Morales was admitted on 4/1/2021.  The following problems were addressed during her hospitalization:  Patient Active Problem List   Diagnosis     Diabetes mellitus type 1 (H)     Chronic cholecystitis   -Postoperative bleed from hepatic/GB  fossa  -Acute blood loss anemia requiring transfusion  -Expected postoperative ileus    Jaz-operative antibiotic therapy included: Ancef.  Post-operative pain control: was via IV until able to tolerate PO intake and transitioned to PO pain meds.    Remarkable hospital course events: Patient had syncopal episodes in recovery and admitted overnight.  She later developed increasing abdominal pain, distention and syncopal episodes. Her Hgb had dropped to 8.5 and a stat CT scan showed a large amount of hemoperitoneum as well as a blush of contrast from the gallbladder fossa suggesting active bleeding.  She was urgently taken to the OR where she underwent Diagnostic laparoscopy, evacuation of hemoperitoneum, and ligation of hepatic vessel.  Afterwards, she had slow recovery of volumes with IVF and 2 unit transfusion of pRBCs.  She was initially symptomatic with dizziness getting up, which slowly also improved.  Orthostatics on 4/4 were normal.  Cardiology consult was obtained.  Compression stockings and adjustment of dietary sodium intake were recommended.  She was able to slowly increase activity, diet intake, bowel activity.  HGB remained stable.  Drain (intraperitoneal) output cleared and was able to be removed on 4/6.  Please see Hospitalist/Cardiology notes for further details if needed.      Jaymie met all criteria for release on 4/6/2021 12:05 PM.  She was afebrile, tolerating diet, pain controlled on PO meds, ambulating well, and had return of bowel function.    Final pathology results: Gallbladder, resection:   - No gallstones identified.   - Mild chronic cholecystitis.   - Reactive lymph nodes with lipogranulomas (2).   - Negative for dysplasia and malignancy.     Medications discontinued or adjusted during this hospitalization: see discharge med list below.    Antibiotics prescribed at discharge: None prescribed     Imaging study follow up needs:   -No studies require specific follow-up    Discharge  Instructions and Follow-Up:  Discharge diet: Low fat   Discharge activity: Lifting restricted to 20 pounds  No heavy lifting, pushing, pulling for 4 week(s)   Discharge follow-up: Follow up with Dr. Cutler in 2-3 weeks   Wound/Incision care: Daily dressing changes  Ice to area for comfort  May get incision wet in shower but do not soak or scrub  Steri-strips off in 10 days       Ana Martinez PA-C      Discharge Disposition   Discharged to home   Condition at discharge: Stable    Pending Results   Unresulted Labs Ordered in the Past 30 Days of this Admission     No orders found from 3/2/2021 to 4/2/2021.          Primary Care Physician   Rosmery Gonsales    Consultations This Hospital Stay   HOSPITALIST IP CONSULT  HOSPITALIST IP CONSULT  NEUROLOGY IP CONSULT  PHARMACY IP CONSULT  CARDIOLOGY IP CONSULT    Discharge Orders      Reason for your hospital stay    Chronic cholecystitis, s/p lap cholecystectomy     Follow-up and recommended labs and tests     Follow up with primary care provider, Rosmery Gonsales, within 7 days   Follow up with surgery as scheduled     Activity    Your activity upon discharge: activity as tolerated     Red blood cell have available     Diet    Follow this diet upon discharge: Orders Placed This Encounter      Combination Diet Low Fat Diet     Discharge Medications   Discharge Medication List as of 4/6/2021 11:31 AM      CONTINUE these medications which have NOT CHANGED    Details   !! amphetamine-dextroamphetamine (ADDERALL) 20 MG tablet Take 10 mg by mouth daily Take at noon, Historical      !! amphetamine-dextroamphetamine (ADDERALL) 20 MG tablet Take 20 mg by mouth every morning, Historical      insulin aspart (NOVOLOG VIAL) 100 UNITS/ML vial Use with insulin pump, Historical      INSULIN PUMP - OUTPATIENT Date Last Updated: 4/2/2021  Omnipod  BASAL RATES and times:  12   AM (midnight): 0.8 units/hour    7     AM: 0.7 units/hour   CARB RATIO and times:  12   AM (midnight):  10  Corection Factor (Sensitivity) and times:  12   AM (midnight): 95 mg/dL  BLOOD G LUCOSE TARGET and times:  12   AM (midnight): 130  6     AM:  100  9    PM:  130  Active Insulin Time: 4 hours, Historical      Prenatal Multivit-Min-Fe-FA (PRE-MIRANDA FORMULA PO) Take 1 chew tab by mouth daily , Historical      acetone, Urine, test (KETOSTIX) STRP 1 strip by In Vitro route once as needed Historical      glucagon (GLUCAGON EMERGENCY) 1 MG injection 1 mg once as needed for low blood sugar , Historical       !! - Potential duplicate medications found. Please discuss with provider.        Allergies   Allergies   Allergen Reactions     Sulfa Drugs Unknown and Hives     Sulfonamide Derivatives      Sulfa (Sulfonamide Antibiotics)     Data   Most Recent 3 CBC's:  Recent Labs   Lab Test 21  0625 21  1739 21  0643 21  0633 21  1808   WBC  --   --   --  4.8  --  6.1 5.5   HGB 7.7* 8.2* 7.9* 8.6*   < > 8.3* 8.4*   MCV  --   --   --  92  --  90 89   PLT  --   --   --  145*  --  128* 135*    < > = values in this interval not displayed.      Most Recent 3 BMP's:  Recent Labs   Lab Test 21  0608 21  0731 21  1534 10/02/20  1757     --  138 135   POTASSIUM 4.1  --  4.1 3.7   CHLORIDE 111*  --  107 103   CO2 28  --  28 27   BUN 11  --  17 15   CR 0.78  --  0.83 0.80   ANIONGAP <1*  --  3 5   JOSE A 7.2*  --  7.8* 9.3   * 280* 157* 306*     Most Recent INR's and Anticoagulation Dosing History:  Anticoagulation Dose History     Recent Dosing and Labs Latest Ref Rng & Units 4/3/2021    INR 0.86 - 1.14 1.15(H)        Most Recent TSH, T4 and A1c Labs:  Recent Labs   Lab Test 21  1139 09/19/15  0000 09/19/15   TSH 0.48   < >  --    A1C  --   --  6.9*    < > = values in this interval not displayed.     Results for orders placed or performed during the hospital encounter of 21   XR Surgery CHEO L/T 5 Min Fluoro w Stills    Narrative    XR  SURGERY CHEO FLUORO LESS THAN 5 MIN W STILLS 4/1/2021 11:09 AM     COMPARISON: None    HISTORY: Cholecystitis    NUMBER OF IMAGES ACQUIRED: 7    VIEWS: T-tube cholangiogram    FLUOROSCOPY TIME: .3 minute(s)      Impression    IMPRESSION: Normal bile duct anatomy. No bile duct dilation. No  evidence of choledocholithiasis.    NIKOS CASEY MD       CT Head w/o Contrast    Narrative    EXAM: CT HEAD W/O CONTRAST  LOCATION: [facility]  DATE/TIME: 4/1/2021 10:50 PM    INDICATION: Seizure, nontraumatic (Age 18-40y)  COMPARISON: 01/17/2012 brain MRI  TECHNIQUE: Routine CT Head without IV contrast. Multiplanar reformats. Dose reduction techniques were used.    FINDINGS:  INTRACRANIAL CONTENTS: No intracranial hemorrhage, extraaxial collection, or mass effect.  No CT evidence of acute infarct. Normal parenchymal attenuation. Normal ventricles and sulci.     VISUALIZED ORBITS/SINUSES/MASTOIDS: No intraorbital abnormality. No paranasal sinus mucosal disease. No middle ear or mastoid effusion.    BONES/SOFT TISSUES: No acute abnormality.      Impression    IMPRESSION:  1.  No acute intracranial process.       CT Abdomen Pelvis w Contrast    Narrative    EXAM: CT ABDOMEN PELVIS W CONTRAST  LOCATION: [facility]  DATE/TIME: 4/1/2021 10:53 PM    INDICATION: Abdominal pain after cholecystectomy.  COMPARISON: Right upper quadrant ultrasound 12/25/2013.  TECHNIQUE: CT scan of the abdomen and pelvis was performed following injection of IV contrast. Multiplanar reformats were obtained. Dose reduction techniques were used.  CONTRAST: 65 mL [id number]    FINDINGS:   LOWER CHEST: Mild bibasilar atelectasis.    HEPATOBILIARY: Postcholecystectomy. Approximately 2.7 x 2.0 cm ill-defined hyperdense focus in the right upper abdomen near the cholecystectomy clips consistent with a focus of active hemorrhage (images 61-78 of axial series 3). Lobulated hyperdense   material along the inferior margin of the liver  consistent with acute hematoma measures up to about 14 x 8 x 11 cm at this site. The hematoma results in mass effect upon the descending duodenum which is medially displaced. Large volume of hyperdense   fluid elsewhere throughout the abdomen and pelvis with a more layering configuration consistent with hemoperitoneum. Periportal edema. Otherwise normal liver. No significant biliary dilatation.    PANCREAS: Normal.    SPLEEN: Normal.    ADRENAL GLANDS: Normal.    KIDNEYS/BLADDER: Normal.    BOWEL: Normal.    LYMPH NODES: Normal.    VASCULATURE: Unremarkable.    PELVIC ORGANS: Normal.    MUSCULOSKELETAL: Normal.      Impression    IMPRESSION:   Active bleeding in the cholecystectomy bed and large volume of hemoperitoneum.    At 11:20 PM, attempts were made to contact the referring physician who reportedly was in the process of taking the patient to the operating room. I discussed the findings with the patient's nurse, Maxine Gayle RN, at 11:25 PM.

## 2021-04-22 ENCOUNTER — TELEPHONE (OUTPATIENT)
Dept: SURGERY | Facility: CLINIC | Age: 41
End: 2021-04-22

## 2021-04-22 NOTE — TELEPHONE ENCOUNTER
Attempted to call patient for post op check.  No answer.  Message and Mychart was left for patient to call back if they had any questions of concerns.     Marilu Hansen PA-C

## 2021-08-08 ENCOUNTER — HEALTH MAINTENANCE LETTER (OUTPATIENT)
Age: 41
End: 2021-08-08

## 2021-10-03 ENCOUNTER — HEALTH MAINTENANCE LETTER (OUTPATIENT)
Age: 41
End: 2021-10-03

## 2022-01-23 ENCOUNTER — HEALTH MAINTENANCE LETTER (OUTPATIENT)
Age: 42
End: 2022-01-23

## 2022-03-19 ENCOUNTER — HEALTH MAINTENANCE LETTER (OUTPATIENT)
Age: 42
End: 2022-03-19

## 2022-09-04 ENCOUNTER — HEALTH MAINTENANCE LETTER (OUTPATIENT)
Age: 42
End: 2022-09-04

## 2023-01-15 ENCOUNTER — HEALTH MAINTENANCE LETTER (OUTPATIENT)
Age: 43
End: 2023-01-15

## 2023-04-29 ENCOUNTER — HEALTH MAINTENANCE LETTER (OUTPATIENT)
Age: 43
End: 2023-04-29

## 2023-07-23 ENCOUNTER — HEALTH MAINTENANCE LETTER (OUTPATIENT)
Age: 43
End: 2023-07-23

## 2024-02-18 ENCOUNTER — HEALTH MAINTENANCE LETTER (OUTPATIENT)
Age: 44
End: 2024-02-18

## (undated) DEVICE — CATH CHOLANGIOGRAM 4.5FR TAUT METAL TIP 20018-M55

## (undated) DEVICE — GLOVE PROTEXIS POWDER FREE SMT 7.5  2D72PT75X

## (undated) DEVICE — Device

## (undated) DEVICE — ENDO TROCAR FIRST ENTRY KII FIOS Z-THRD 05X100MM CTF03

## (undated) DEVICE — SUCTION TIP YANKAUER W/O VENT K86

## (undated) DEVICE — DRAPE LEGGINGS 8421

## (undated) DEVICE — SUCTION CANISTER MEDIVAC LINER 3000ML W/LID 65651-530

## (undated) DEVICE — GOWN IMPERVIOUS ZONED XLG 9041

## (undated) DEVICE — TUBING IV EXTENSION SET ANESTHESIA 34" MLL 2C6227

## (undated) DEVICE — ESU PENCIL W/HOLSTER E2350H

## (undated) DEVICE — GLOVE PROTEXIS BLUE W/NEU-THERA 8.0  2D73EB80

## (undated) DEVICE — PREP SKIN SCRUB TRAY 4461A

## (undated) DEVICE — ESU CORD MONOPOLAR 10'  E0510

## (undated) DEVICE — ESU GROUND PAD ADULT W/CORD E7507

## (undated) DEVICE — BAG CLEAR TRASH 1.3M 39X33" P4040C

## (undated) DEVICE — TUBING SUCTION 12"X1/4" N612

## (undated) DEVICE — CATH IV ANGIO INTRO 12GA 382277

## (undated) DEVICE — ESU ELEC BLADE 2.75" COATED/INSULATED E1455

## (undated) DEVICE — SUCTION LAPAROSCOPIC SMOKE EVAC SYSTEM SEL7000A

## (undated) DEVICE — LINEN POUCH DBL 5427

## (undated) DEVICE — ESU DISSECTOR SONICISION CVD JAW ULATRSONIC SCDA39

## (undated) DEVICE — GLOVE PROTEXIS W/NEU-THERA 7.5  2D73TE75

## (undated) DEVICE — ESU LIGASURE LAPAROSCOPIC BLUNT TIP SEALER 5MMX37CM LF1837

## (undated) DEVICE — DRAIN JACKSON PRATT 15FR ROUND SIL LF JP-2229

## (undated) DEVICE — CONNECTOR STOPCOCK 3 WAY MALE LL HI-FLO MX9311L

## (undated) DEVICE — CLIP APPLIER ENDO 5MM M/L LIGAMAX EL5ML

## (undated) DEVICE — SUCTION IRR STRYKERFLOW II W/TIP 250-070-520

## (undated) DEVICE — PREP CHLORAPREP 26ML TINTED HI-LITE ORANGE 930815

## (undated) DEVICE — PREP SCRUB SOL EXIDINE 4% CHG 4OZ 29002-404

## (undated) DEVICE — SYR 30ML LL W/O NDL 302832

## (undated) DEVICE — ENDO TROCAR OPTICAL ACCESS KII Z-THRD 12X100MM C0R85

## (undated) DEVICE — SU VICRYL 4-0 P-3 18" UND J494G

## (undated) DEVICE — GLOVE PROTEXIS POWDER FREE 8.0 ORTHOPEDIC 2D73ET80

## (undated) DEVICE — SU VICRYL 1 CT-2 27" J335H

## (undated) DEVICE — DRAIN JACKSON PRATT RESERVOIR 100ML SU130-1305

## (undated) DEVICE — ADH SKIN CLOSURE PREMIERPRO EXOFIN MICOR HV 0.5ML 3471

## (undated) DEVICE — RAD RX ISOVUE 300 (50ML) 61% IOPAMIDOL CHARGE PER ML

## (undated) DEVICE — GOWN XLG DISP 9545

## (undated) DEVICE — SOL NACL 0.9% INJ 250ML BAG 2B1322Q

## (undated) DEVICE — LINEN FULL SHEET 5511

## (undated) DEVICE — ENDO TROCAR FIRST ENTRY KII FIOS Z-THRD 11X100MM CTF33

## (undated) DEVICE — LINEN TOWEL PACK X10 5473

## (undated) DEVICE — ENDO TROCAR SLEEVE KII Z-THREADED 05X100MM CTS02

## (undated) DEVICE — DRAPE C-ARM 60X42" 1013

## (undated) DEVICE — SOL NACL 0.9% INJ 1000ML BAG 07983-09

## (undated) DEVICE — SU VICRYL 0 CT-2 CR 8X18" J727D

## (undated) DEVICE — LINEN HALF SHEET 5512

## (undated) DEVICE — SU VICRYL 4-0 PS-2 18" UND J496H

## (undated) DEVICE — DECANTER BAG 2002S

## (undated) DEVICE — CATH CHOLANGIOGRAM KUMAR CC-019

## (undated) DEVICE — ENDO POUCH UNIV RETRIEVAL SYSTEM INZII 10MM CD001

## (undated) RX ORDER — PROPOFOL 10 MG/ML
INJECTION, EMULSION INTRAVENOUS
Status: DISPENSED
Start: 2021-04-01

## (undated) RX ORDER — ONDANSETRON 2 MG/ML
INJECTION INTRAMUSCULAR; INTRAVENOUS
Status: DISPENSED
Start: 2021-04-01

## (undated) RX ORDER — FENTANYL CITRATE 50 UG/ML
INJECTION, SOLUTION INTRAMUSCULAR; INTRAVENOUS
Status: DISPENSED
Start: 2021-04-01

## (undated) RX ORDER — BUPIVACAINE HYDROCHLORIDE AND EPINEPHRINE 5; 5 MG/ML; UG/ML
INJECTION, SOLUTION EPIDURAL; INTRACAUDAL; PERINEURAL
Status: DISPENSED
Start: 2021-04-01

## (undated) RX ORDER — CEFAZOLIN SODIUM 2 G/100ML
INJECTION, SOLUTION INTRAVENOUS
Status: DISPENSED
Start: 2021-04-01

## (undated) RX ORDER — ACETAMINOPHEN 325 MG/1
TABLET ORAL
Status: DISPENSED
Start: 2021-04-01

## (undated) RX ORDER — METOPROLOL TARTRATE 1 MG/ML
INJECTION, SOLUTION INTRAVENOUS
Status: DISPENSED
Start: 2021-04-01

## (undated) RX ORDER — NEOSTIGMINE METHYLSULFATE 1 MG/ML
VIAL (ML) INJECTION
Status: DISPENSED
Start: 2021-04-01

## (undated) RX ORDER — GLYCOPYRROLATE 0.2 MG/ML
INJECTION INTRAMUSCULAR; INTRAVENOUS
Status: DISPENSED
Start: 2021-04-01

## (undated) RX ORDER — LIDOCAINE HYDROCHLORIDE 10 MG/ML
INJECTION, SOLUTION EPIDURAL; INFILTRATION; INTRACAUDAL; PERINEURAL
Status: DISPENSED
Start: 2021-04-01

## (undated) RX ORDER — DEXAMETHASONE SODIUM PHOSPHATE 4 MG/ML
INJECTION, SOLUTION INTRA-ARTICULAR; INTRALESIONAL; INTRAMUSCULAR; INTRAVENOUS; SOFT TISSUE
Status: DISPENSED
Start: 2021-04-01

## (undated) RX ORDER — BUPIVACAINE HYDROCHLORIDE AND EPINEPHRINE 2.5; 5 MG/ML; UG/ML
INJECTION, SOLUTION EPIDURAL; INFILTRATION; INTRACAUDAL; PERINEURAL
Status: DISPENSED
Start: 2021-04-01

## (undated) RX ORDER — HYDROMORPHONE HYDROCHLORIDE 1 MG/ML
INJECTION, SOLUTION INTRAMUSCULAR; INTRAVENOUS; SUBCUTANEOUS
Status: DISPENSED
Start: 2021-04-02

## (undated) RX ORDER — KETOROLAC TROMETHAMINE 30 MG/ML
INJECTION, SOLUTION INTRAMUSCULAR; INTRAVENOUS
Status: DISPENSED
Start: 2021-04-01